# Patient Record
Sex: FEMALE | Race: WHITE | NOT HISPANIC OR LATINO | Employment: OTHER | URBAN - METROPOLITAN AREA
[De-identification: names, ages, dates, MRNs, and addresses within clinical notes are randomized per-mention and may not be internally consistent; named-entity substitution may affect disease eponyms.]

---

## 2018-01-12 NOTE — RESULT NOTES
Message   Recorded as Task   Date: 06/06/2016 05:05 PM, Created By: Teresa Fraser   Task Name: Verify Patient Results   Assigned To:  Liz Salmon   Regarding Patient: Nikki Fournier, Status: Active   CommentOlin Cobia - 06 Jun 2016 5:05 PM        Liz Salmon - 10 Angelito 2016 4:15 PM     TASK EDITED  Card sent that Pap was normal         Signatures   Electronically signed by : Corky Benjamin CNM; Angelito 10 2016  4:16PM EST                       (Author)

## 2018-01-12 NOTE — RESULT NOTES
Message   Recorded as Task   Date: 06/20/2016 03:12 PM, Created By: Davin Zuñiga   Task Name: Verify Patient Results   Assigned To: Liz Salmon   Regarding Patient: Francisco Marcelo, Status: Active   Comment:    Liz Salmon - 20 Jun 2016 3:12 PM        Liz Salmon - 20 Jun 2016 4:28 PM     TASK EDITED  Called patient discussed osteopenia        Verified Results  * MAMMO SCREENING BILATERAL W CAD 20Jun2016 01:55PM [place] [facility] Order Number: [id number]     Test Name Result Flag Reference   MAMMO SCREENING BILATERAL W CAD (Report)     Patient History:   Patient is postmenopausal    No known family history of cancer  Took hormonal contraceptives for 2 years  Took estrogen for 5    years  Patient has never smoked  Patient's BMI is 32 3  Reason for exam: screening (asymptomatic)  Mammo Screening Bilateral W CAD: June 20, 2016 - Check In #:    [de-identified]   Bilateral MLO and CC view(s) were taken  Technologist: RT Sarah(R)(M)   Prior study comparison: December 30, 2014, bilateral [facility] dig    scrn mammo w/CAD, performed at [address] [place] [address] Pkwy  December 16, 2013, bilateral [facility] dig scrn mammo    w/CAD, performed at [address]  October 24, 2012, bilateral digital screening mammogram, performed at [address] [place] [address]  August 29, 2011, bilateral digital    screening mammogram, performed at [address] [place] [address]    August 13, 2010, bilateral digital screening mammogram,    performed at [address] [place] [address]      There are scattered fibroglandular densities  The parenchymal pattern appears stable  No dominant soft tissue    mass or suspicious calcifications are noted  The skin and nipple   contours are within normal limits  No mammographic evidence of malignancy  No    significant changes when compared with prior studies  ASSESSMENT: BiRad:1 - Negative     Recommendation:   Routine screening mammogram in 1 year   A reminder letter will be   scheduled  Analyzed by CAD     8-10% of cancers will be missed on mammography  Management of a    palpable abnormality must be based on clinical grounds  Patients   will be notified of their results via letter from our facility  Accredited by [employer] of [employer] and FDA       Transcription Location: [address]: [id number]     Risk Value(s):   Tyrer-Cuzick 10 Year: 2 835%, Tyrer-Cuzick Lifetime: 5 917%,    Myriad Table: 1 5%, FLORENTIN 5 Year: 1 8%, NCI Lifetime: 6 9%   Signed by:   Sugar Carter MD   6/20/16       Signatures   Electronically signed by : Angela Aleman CNM; Jun 20 2016  4:32PM EST                       (Author)

## 2018-03-01 ENCOUNTER — OFFICE VISIT (OUTPATIENT)
Dept: OBGYN CLINIC | Facility: MEDICAL CENTER | Age: 68
End: 2018-03-01
Payer: MEDICARE

## 2018-03-01 VITALS
SYSTOLIC BLOOD PRESSURE: 120 MMHG | DIASTOLIC BLOOD PRESSURE: 78 MMHG | HEIGHT: 59 IN | WEIGHT: 163 LBS | BODY MASS INDEX: 32.86 KG/M2

## 2018-03-01 DIAGNOSIS — Z91.89 AT RISK FOR BONE DENSITY LOSS: ICD-10-CM

## 2018-03-01 DIAGNOSIS — Z01.419 ENCOUNTER FOR GYNECOLOGICAL EXAMINATION WITHOUT ABNORMAL FINDING: Primary | ICD-10-CM

## 2018-03-01 DIAGNOSIS — Z12.31 ENCOUNTER FOR SCREENING MAMMOGRAM FOR BREAST CANCER: ICD-10-CM

## 2018-03-01 DIAGNOSIS — Z78.0 POSTMENOPAUSAL: ICD-10-CM

## 2018-03-01 PROBLEM — E66.9 OBESITY (BMI 30.0-34.9): Status: ACTIVE | Noted: 2018-03-01

## 2018-03-01 PROBLEM — E03.9 HYPOTHYROIDISM: Status: ACTIVE | Noted: 2018-03-01

## 2018-03-01 PROBLEM — E78.00 HYPERCHOLESTEROLEMIA: Status: ACTIVE | Noted: 2018-03-01

## 2018-03-01 PROBLEM — M85.9 LOW BONE DENSITY: Status: ACTIVE | Noted: 2018-03-01

## 2018-03-01 PROBLEM — E66.811 OBESITY (BMI 30.0-34.9): Status: ACTIVE | Noted: 2018-03-01

## 2018-03-01 PROCEDURE — G0101 CA SCREEN;PELVIC/BREAST EXAM: HCPCS | Performed by: NURSE PRACTITIONER

## 2018-03-01 RX ORDER — ATORVASTATIN CALCIUM 40 MG/1
TABLET, FILM COATED ORAL
COMMUNITY
End: 2018-03-01 | Stop reason: ALTCHOICE

## 2018-03-01 RX ORDER — ATORVASTATIN CALCIUM 20 MG/1
TABLET, FILM COATED ORAL
COMMUNITY
Start: 2018-01-31

## 2018-03-01 RX ORDER — METOPROLOL SUCCINATE 50 MG/1
TABLET, EXTENDED RELEASE ORAL
COMMUNITY
Start: 2017-12-29

## 2018-03-01 RX ORDER — LISINOPRIL 10 MG/1
TABLET ORAL
COMMUNITY
Start: 2017-12-29

## 2018-03-01 RX ORDER — LEVOTHYROXINE SODIUM 0.03 MG/1
TABLET ORAL
COMMUNITY
Start: 2018-02-27

## 2018-03-01 RX ORDER — DOXYCYCLINE HYCLATE 100 MG/1
CAPSULE ORAL
COMMUNITY
Start: 2017-11-28 | End: 2018-03-01 | Stop reason: ALTCHOICE

## 2018-03-01 RX ORDER — PREDNISONE 20 MG/1
TABLET ORAL
COMMUNITY
Start: 2017-11-28 | End: 2018-03-01 | Stop reason: ALTCHOICE

## 2018-03-01 NOTE — PROGRESS NOTES
[facility]  [facility]  Roberto Richard 79 y o  SUBJECTIVE    HPI: Roberto Richard is a 79 y o   female presenting today for annual GYN exam  Her last gynecologic exam was 2016 at our practice  Postmenopausal, FMP around age 39  Patient denies post-menopausal vaginal bleeding  Matthias Garcia Sexually active with 1 Male partner, monogamous,   No STI testing today  Denies sexual concerns  Has no breast, bowel, or vaginal concerns  KEEGAN unchanged and not bothersome  Retired  Gynecologic History  Last pap smear: Date: 2016 and Result: NILM, HPV(-)  History of abnormal pap smears: Denies  The patient denies history of sexually transmitted disease  Last mammogram: 2016, BI RADS - 1  History of abnormal mammogram: Denies  Contraceptive method: Postmenopausal  Dyspareunia: denies      Obstetric History     x1, CSEC x1      Health Maintenance  Self-breast exams: yes  Metabolic screening: up-to-date with PCP (daria)  Calcium & Vitamin D: taking  Tobacco cessation: N/A  DEXA bone density scan: ordered; last was 2016 (low BMD, return in 18-24mos)  Colonoscopy: last was 7-8y ago, return in 10y    At todays visit I discussed the importance of self-breast exams, exercise and healthy diet  The recommendation is for annual mammograms  Reviewed ASCCP guidelines for cervical cancer screening, which include discontinuing screening after age 72 in the presence of a normal pap smear history  Safe sex practices were strongly encouraged to protect against sexually transmitted infections  Importance of vitamin D and adequate calcium intake was reviewed  All questions were answered to her apparent satisfaction       The following portions of the patient's history were reviewed and updated as appropriate: allergies, current medications, past family history, past medical history, past social history, past surgical history and problem list     ROS  General ROS: negative for chills or fever  Breast ROS: negative for breast lumps  Respiratory ROS: no cough, shortness of breath, or wheezing  Cardiovascular ROS: no chest pain or dyspnea on exertion  Gastrointestinal ROS: no abdominal pain, change in bowel habits, or black or bloody stools  Genito-Urinary ROS: no dysuria, trouble voiding, or hematuria  negative for - vulvar/vaginal symptoms  Neurological ROS: negative    OBJECTIVE  Vitals:    03/01/18 0909   BP: 120/78   BP Location: Left arm   Patient Position: Sitting   Weight: 73 9 kg (163 lb)   Height: 4' 11" (1 499 m)       Physical Exam   Constitutional: She is oriented to person, place, and time  Vital signs are normal  She appears well-developed and well-nourished  Genitourinary: Vagina normal and uterus normal  Pelvic exam was performed with patient supine  There is no rash, tenderness or lesion on the right labia  There is no rash, tenderness or lesion on the left labia  No erythema in the vagina  No vaginal discharge found  Right adnexum does not display mass and does not display tenderness  Left adnexum does not display mass and does not display tenderness  Cervix is parous  Cervix does not exhibit motion tenderness  Uterus is not tender  HENT:   Head: Normocephalic and atraumatic  Neck: No thyromegaly present  Cardiovascular: Normal heart sounds  Pulmonary/Chest: Effort normal and breath sounds normal  Right breast exhibits no inverted nipple, no mass, no nipple discharge, no skin change and no tenderness  Left breast exhibits no inverted nipple, no mass, no nipple discharge, no skin change and no tenderness  Breasts are symmetrical    Abdominal: Soft  Normal appearance  Musculoskeletal: Normal range of motion  Lymphadenopathy:     She has no axillary adenopathy  Neurological: She is alert and oriented to person, place, and time  Skin: Skin is warm, dry and intact  Psychiatric: She has a normal mood and affect   Her behavior is normal  Vitals reviewed  ASSESSMENT  Normal postmenopausal female exam        PLAN  1 - Order for annual mammogram provided to patient at todays visit  2 - Order for repeat DEXA bone density scan provided to patient at today's visit  3 - RTO 1 year for annual GYN exam        All questions were answered & Carina expressed understanding        Khurram Reaves

## 2018-06-01 ENCOUNTER — HOSPITAL ENCOUNTER (OUTPATIENT)
Dept: RADIOLOGY | Facility: MEDICAL CENTER | Age: 68
Discharge: HOME | End: 2018-06-01
Payer: MEDICARE

## 2018-06-01 DIAGNOSIS — Z91.89 AT RISK FOR BONE DENSITY LOSS: ICD-10-CM

## 2018-06-01 DIAGNOSIS — Z78.0 POSTMENOPAUSAL: ICD-10-CM

## 2018-06-01 DIAGNOSIS — Z12.31 ENCOUNTER FOR SCREENING MAMMOGRAM FOR BREAST CANCER: ICD-10-CM

## 2018-06-01 PROCEDURE — 77063 BREAST TOMOSYNTHESIS BI: CPT

## 2018-06-01 PROCEDURE — 77080 DXA BONE DENSITY AXIAL: CPT

## 2018-06-01 PROCEDURE — 77067 SCR MAMMO BI INCL CAD: CPT

## 2018-06-05 NOTE — PROGRESS NOTES
Please call patient regarding her bone mineral density scan results  No significant change from previous scans showing Low BMD  Recommend 1200 mg calcium and 1000 IU of Vitamin D, as well as weight bearing and muscle strengthening exercises  She will need repeat DXA in 18-24 months on the same machine  Thank you!

## 2018-06-06 ENCOUNTER — TELEPHONE (OUTPATIENT)
Dept: OBGYN CLINIC | Facility: MEDICAL CENTER | Age: 68
End: 2018-06-06

## 2018-06-06 NOTE — TELEPHONE ENCOUNTER
----- Message from [place] J[place] sent at 6/5/2018  8:03 AM EDT -----  Please call patient regarding her bone mineral density scan results  No significant change from previous scans showing Low BMD  Recommend 1200 mg calcium and 1000 IU of Vitamin D, as well as weight bearing and muscle strengthening exercises  She will need repeat DXA in 18-24 months on the same machine  Thank you! Tried to call patient x2 and no VM or AM to LM  Sound like someone is there but no answer and then call drops  No communication consent on file

## 2019-08-05 ENCOUNTER — TELEPHONE (OUTPATIENT)
Dept: OBGYN CLINIC | Facility: CLINIC | Age: 69
End: 2019-08-05

## 2019-08-05 DIAGNOSIS — Z12.39 BREAST SCREENING, UNSPECIFIED: Primary | ICD-10-CM

## 2019-08-14 ENCOUNTER — TRANSCRIBE ORDERS (OUTPATIENT)
Dept: ADMINISTRATIVE | Facility: HOSPITAL | Age: 69
End: 2019-08-14

## 2019-08-14 DIAGNOSIS — Z12.39 BREAST SCREENING, UNSPECIFIED: Primary | ICD-10-CM

## 2019-09-18 ENCOUNTER — HOSPITAL ENCOUNTER (OUTPATIENT)
Dept: RADIOLOGY | Facility: MEDICAL CENTER | Age: 69
Discharge: HOME | End: 2019-09-18
Payer: MEDICARE

## 2019-09-18 VITALS — WEIGHT: 163 LBS | HEIGHT: 59 IN | BODY MASS INDEX: 32.86 KG/M2

## 2019-09-18 DIAGNOSIS — Z12.39 BREAST SCREENING, UNSPECIFIED: ICD-10-CM

## 2019-09-18 PROCEDURE — 77063 BREAST TOMOSYNTHESIS BI: CPT

## 2019-09-18 PROCEDURE — 77067 SCR MAMMO BI INCL CAD: CPT

## 2020-10-26 ENCOUNTER — ANNUAL EXAM (OUTPATIENT)
Dept: OBGYN CLINIC | Facility: MEDICAL CENTER | Age: 70
End: 2020-10-26
Payer: MEDICARE

## 2020-10-26 VITALS
TEMPERATURE: 97.5 F | WEIGHT: 164 LBS | BODY MASS INDEX: 33.12 KG/M2 | SYSTOLIC BLOOD PRESSURE: 132 MMHG | DIASTOLIC BLOOD PRESSURE: 86 MMHG

## 2020-10-26 DIAGNOSIS — Z12.31 ENCOUNTER FOR SCREENING MAMMOGRAM FOR MALIGNANT NEOPLASM OF BREAST: ICD-10-CM

## 2020-10-26 DIAGNOSIS — Z13.820 SCREENING FOR OSTEOPOROSIS: Primary | ICD-10-CM

## 2020-10-26 DIAGNOSIS — Z01.419 ENCOUNTER FOR GYNECOLOGICAL EXAMINATION (GENERAL) (ROUTINE) WITHOUT ABNORMAL FINDINGS: ICD-10-CM

## 2020-10-26 DIAGNOSIS — Z00.00 HEALTHCARE MAINTENANCE: ICD-10-CM

## 2020-10-26 DIAGNOSIS — Z78.0 POSTMENOPAUSAL STATUS: ICD-10-CM

## 2020-10-26 DIAGNOSIS — Z80.3 FAMILY HISTORY OF BREAST CANCER IN FIRST DEGREE RELATIVE: ICD-10-CM

## 2020-10-26 PROCEDURE — G0101 CA SCREEN;PELVIC/BREAST EXAM: HCPCS | Performed by: NURSE PRACTITIONER

## 2021-02-13 DIAGNOSIS — Z23 ENCOUNTER FOR IMMUNIZATION: ICD-10-CM

## 2021-07-22 ENCOUNTER — HOSPITAL ENCOUNTER (OUTPATIENT)
Dept: RADIOLOGY | Facility: MEDICAL CENTER | Age: 71
Discharge: HOME | End: 2021-07-22
Payer: MEDICARE

## 2021-07-22 VITALS — BODY MASS INDEX: 33.06 KG/M2 | WEIGHT: 164 LBS | HEIGHT: 59 IN

## 2021-07-22 DIAGNOSIS — Z78.0 POSTMENOPAUSAL STATUS: ICD-10-CM

## 2021-07-22 DIAGNOSIS — Z12.31 ENCOUNTER FOR SCREENING MAMMOGRAM FOR MALIGNANT NEOPLASM OF BREAST: ICD-10-CM

## 2021-07-22 DIAGNOSIS — Z13.820 SCREENING FOR OSTEOPOROSIS: ICD-10-CM

## 2021-07-22 PROCEDURE — 77063 BREAST TOMOSYNTHESIS BI: CPT

## 2021-07-22 PROCEDURE — 77067 SCR MAMMO BI INCL CAD: CPT

## 2021-07-22 PROCEDURE — 77080 DXA BONE DENSITY AXIAL: CPT

## 2021-07-26 ENCOUNTER — TELEPHONE (OUTPATIENT)
Dept: OBGYN CLINIC | Facility: MEDICAL CENTER | Age: 71
End: 2021-07-26

## 2021-07-26 NOTE — TELEPHONE ENCOUNTER
Spoke to pt and let her know results   Vit D she is taking 2,000Iu/day and not sure aboutthe Ca, but told her to check and make sure its atleast 1200 mg

## 2021-07-26 NOTE — TELEPHONE ENCOUNTER
Her DEXA shows osteopenia   Recommendations are:  A daily intake of at least 1200 mg calcium and 1000 IU of Vitamin D, as well as weight bearing and muscle strengthening exercise, fall prevention and avoidance of tobacco and excessive alcohol intake as basic preventive measures are suggested  Repeat in 2 years

## 2022-11-17 ENCOUNTER — HOSPITAL ENCOUNTER (OUTPATIENT)
Dept: RADIOLOGY | Facility: MEDICAL CENTER | Age: 72
Discharge: HOME | End: 2022-11-17

## 2022-11-17 VITALS — HEIGHT: 59 IN | WEIGHT: 164 LBS | BODY MASS INDEX: 33.06 KG/M2

## 2022-11-17 DIAGNOSIS — Z12.31 ENCOUNTER FOR SCREENING MAMMOGRAM FOR MALIGNANT NEOPLASM OF BREAST: ICD-10-CM

## 2023-03-27 ENCOUNTER — APPOINTMENT (OUTPATIENT)
Dept: RADIOLOGY | Age: 73
End: 2023-03-27

## 2023-03-27 ENCOUNTER — OFFICE VISIT (OUTPATIENT)
Dept: OBGYN CLINIC | Facility: CLINIC | Age: 73
End: 2023-03-27

## 2023-03-27 VITALS
HEART RATE: 72 BPM | HEIGHT: 59 IN | DIASTOLIC BLOOD PRESSURE: 79 MMHG | WEIGHT: 164 LBS | BODY MASS INDEX: 33.06 KG/M2 | SYSTOLIC BLOOD PRESSURE: 133 MMHG

## 2023-03-27 DIAGNOSIS — M25.562 ACUTE PAIN OF LEFT KNEE: Primary | ICD-10-CM

## 2023-03-27 DIAGNOSIS — M17.12 PRIMARY OSTEOARTHRITIS OF LEFT KNEE: ICD-10-CM

## 2023-03-27 DIAGNOSIS — M25.562 ACUTE PAIN OF LEFT KNEE: ICD-10-CM

## 2023-03-27 RX ORDER — BUPIVACAINE HYDROCHLORIDE 2.5 MG/ML
2 INJECTION, SOLUTION INFILTRATION; PERINEURAL
Status: COMPLETED | OUTPATIENT
Start: 2023-03-27 | End: 2023-03-27

## 2023-03-27 RX ORDER — DICLOFENAC SODIUM 75 MG/1
TABLET, DELAYED RELEASE ORAL
COMMUNITY
Start: 2023-03-24

## 2023-03-27 RX ORDER — TRIAMCINOLONE ACETONIDE 40 MG/ML
80 INJECTION, SUSPENSION INTRA-ARTICULAR; INTRAMUSCULAR
Status: COMPLETED | OUTPATIENT
Start: 2023-03-27 | End: 2023-03-27

## 2023-03-27 RX ADMIN — TRIAMCINOLONE ACETONIDE 80 MG: 40 INJECTION, SUSPENSION INTRA-ARTICULAR; INTRAMUSCULAR at 15:27

## 2023-03-27 RX ADMIN — BUPIVACAINE HYDROCHLORIDE 2 ML: 2.5 INJECTION, SOLUTION INFILTRATION; PERINEURAL at 15:27

## 2023-03-27 NOTE — PATIENT INSTRUCTIONS
Hyaluronic Acid (By injection)   Hyaluronic Acid (uny-iv-zex-ON-ate AS-id)  Treats severe pain in your knee due to osteoarthritis  Brand Name(s): Durolane, Euflexxa, Gel-One, GelSyn-3, GenVisc 850, Hyalgan, Hymovis, Monovisc, Orthovisc, Supartz FX, TriLURON, TriVisc, Visco-3   There may be other brand names for this medicine  When This Medicine Should Not Be Used: This medicine is not right for everyone  You should not receive it if you had an allergic reaction to hyaluronic acid or if you have a bleeding disorder  How to Use This Medicine:   Injectable  Your doctor will tell you how many injections you will need  This medicine is injected into your knee joint  A nurse or other health provider will give you this medicine  Drugs and Foods to Avoid:      Ask your doctor or pharmacist before using any other medicine, including over-the-counter medicines, vitamins, and herbal products  Warnings While Using This Medicine:   Tell your doctor if you are pregnant or breastfeeding, or if you have any allergies, including to birds, feathers, or eggs  Rest your knee for 48 hours after you receive an injection  Do not do strenuous, weightbearing activities, such as jogging or tennis  Avoid activities that keep you standing for longer than 1 hour  Possible Side Effects While Using This Medicine:   Call your doctor right away if you notice any of these side effects: Allergic reaction: Itching or hives, swelling in your face or hands, swelling or tingling in your mouth or throat, chest tightness, trouble breathing  If you notice these less serious side effects, talk with your doctor:   Mild increase in pain or swelling in your knee  Pain, redness, or swelling where the medicine is injected  If you notice other side effects that you think are caused by this medicine, tell your doctor  Call your doctor for medical advice about side effects   You may report side effects to FDA at 9-421-FDA-1521  © Copyright [employer] 2022 Information is for End User's use only and may not be sold, redistributed or otherwise used for commercial purposes  The above information is an  only  It is not intended as medical advice for individual conditions or treatments  Talk to your doctor, nurse or pharmacist before following any medical regimen to see if it is safe and effective for you

## 2023-03-27 NOTE — PROGRESS NOTES
Orthopaedic Surgery - Office Note  Georgie Cr (1 y o  female)   : 1950   MRN: [id number]  Encounter Date: 3/27/2023    Chief Complaint   Patient presents with   • Left Knee - Pain         Assessment/Plan  Diagnoses and all orders for this visit:    Acute pain of left knee  -     XR knee 3 vw left non injury; Future    Primary osteoarthritis of left knee    Other orders  -     diclofenac (VOLTAREN) 75 mg EC tablet  -     Large joint arthrocentesis    The left knee osteoarthritis diagnosis was discussed with the patient at length today  Conservative treatments consist of icing, oral anti-inflammatories which she is already taking and physical therapy  She does not wish to consider physical therapy or bracing at this time  The risks and benefits of a cortisone injection were reviewed with her in the office today and she would like to consider a cortisone injection  We reviewed if the cortisone injection does not completely resolve her symptoms a Visco injection series could be considered and she will be provided information regarding this in her after visit summary  The definitive treatment of a total knee arthroplasty was reviewed and she will discuss further with Dr Lizbeth Bolden with whom she has an appointment upcoming in late April  She does not wish to consider a total knee arthroplasty at this time however and it is felt safe to proceed with a cortisone injection  Return for Recheck with Dr Lizbeth Bolden (patient already has appt)  History of Present Illness  This is a new patient with left knee pain  Patient reports she has had ongoing left knee pain for many years but it is gotten acutely worse  She denies any new trauma that started the pain  The pain is primarily in the lateral aspect but also has anterior medial pain as well  Her PCP started her on diclofenac 75 mg which helped a little bit  No calf pain or paresthesias are reported    She reports she has had cortisone injections in "multiple shoulders feet and hands without any problems  She does not wish to consider a total knee replacement at this time  She reports that she has an upcoming appointment in late April with Dr Michelle Puente  Patient is not diabetic    Review of Systems  Pertinent items are noted in HPI  All other systems were reviewed and are negative  Physical Exam  /79 (BP Location: Right arm, Patient Position: Sitting, Cuff Size: Standard)   Pulse 72   Ht 4' 11\" (1 499 m)   Wt 74 4 kg (164 lb)   BMI 33 12 kg/m²   Cons: Appears well  No apparent distress  Psych: Alert  Oriented x3  Mood and affect normal   Eyes: PERRLA, EOMI  Resp: Normal effort  No audible wheezing or stridor  CV: Palpable pulse  No discernable arrhythmia  Lymph:  No palpable cervical, axillary, or inguinal lymphadenopathy  Skin: Warm  No palpable masses  No visible lesions  Neuro: Normal muscle tone  Normal and symmetric DTR's  Patient's left knee is without intra-articular effusion today  She has tenderness in the lateral joint line  Slight valgus deformity  Mild medial joint line tenderness  Moderate retropatellar crepitus through a full extension and flexion to 120 degrees  Quad and hamstring strength are well-maintained at 5 out of 5  There is no pain or instability with valgus and varus stressing at full extension and 20 degrees of flexion  She has no calf tenderness and a negative Homans  Her hip moves well and she has a negative straight leg raise  X-rays performed in the office today 3 views of the left knee show no acute fractures or dislocations  She has severe lateral joint space collapse with bone-on-bone deformity and osteophyte formations noted in the medial tibial plateau and medial femoral condyle  Medial joint space narrowing is noted  Moderate patellofemoral degenerative changes are noted with osteophyte formation appreciated    X-rays were reviewed from an orthopedic standpoint will await official " "radiologist interpretation  Studies Reviewed      Large joint arthrocentesis: L knee  Universal Protocol:  Consent: Verbal consent obtained  Risks and benefits: risks, benefits and alternatives were discussed  Consent given by: patient  Time out: Immediately prior to procedure a \"time out\" was called to verify the correct patient, procedure, equipment, support staff and site/side marked as required  Patient understanding: patient states understanding of the procedure being performed  Patient consent: the patient's understanding of the procedure matches consent given  Relevant documents: relevant documents present and verified  Test results: test results available and properly labeled  Site marked: the operative site was marked  Radiology Images displayed and confirmed  If images not available, report reviewed: imaging studies available  Patient identity confirmed: verbally with patient    Supporting Documentation  Indications: pain   Procedure Details  Location: knee - L knee  Preparation: Patient was prepped and draped in the usual sterile fashion  Needle size: 22 G  Ultrasound guidance: no  Approach: anterolateral  Medications administered: 2 mL bupivacaine 0 25 %; 80 mg triamcinolone acetonide 40 mg/mL    Patient tolerance: patient tolerated the procedure well with no immediate complications  Dressing:  Sterile dressing applied        Medical, Surgical, Family, and Social History  The patient's medical history, family history, and social history, were reviewed and updated as appropriate      Past Medical History:   Diagnosis Date   • Asymptomatic menopausal state    • Breast lump    • Hyperlipemia    • Pap smear abnormality of cervix with ASCUS favoring benign    • Plantar fasciitis        Past Surgical History:   Procedure Laterality Date   • CATARACT EXTRACTION     •  SECTION     • COLONOSCOPY         Family History   Problem Relation Age of Onset   • Breast cancer Sister         Diagnosed in " her 45s   • No Known Problems Mother    • No Known Problems Father    • No Known Problems Maternal Grandmother    • No Known Problems Maternal Grandfather    • No Known Problems Paternal Grandmother    • No Known Problems Paternal Grandfather    • No Known Problems Son    • No Known Problems Son        Social History     Occupational History   • Occupation: Retired   Tobacco Use   • Smoking status: Never   • Smokeless tobacco: Never   Substance and Sexual Activity   • Alcohol use: Yes     Comment: remote social alcohol use   • Drug use: No   • Sexual activity: Yes     Partners: Male     Birth control/protection: Post-menopausal       Allergies   Allergen Reactions   • Penicillins          Current Outpatient Medications:   •  Aspirin Buf,CaCarb-MgCarb-MgO, 81 MG TABS, Take by mouth, Disp: , Rfl:   •  atorvastatin (LIPITOR) 20 mg tablet, , Disp: , Rfl:   •  cholecalciferol (VITAMIN D3) 1,000 units tablet, Take 1 tablet by mouth daily, Disp: , Rfl:   •  levothyroxine 25 mcg tablet, , Disp: , Rfl:   •  lisinopril (ZESTRIL) 10 mg tablet, , Disp: , Rfl:   •  metoprolol succinate (TOPROL-XL) 50 mg 24 hr tablet, , Disp: , Rfl:   •  diclofenac (VOLTAREN) 75 mg EC tablet, , Disp: , Rfl:       Tutu Vogt PA-C

## 2023-04-28 ENCOUNTER — OFFICE VISIT (OUTPATIENT)
Dept: OBGYN CLINIC | Facility: MEDICAL CENTER | Age: 73
End: 2023-04-28

## 2023-04-28 VITALS
SYSTOLIC BLOOD PRESSURE: 122 MMHG | WEIGHT: 163.2 LBS | HEART RATE: 66 BPM | DIASTOLIC BLOOD PRESSURE: 74 MMHG | BODY MASS INDEX: 32.9 KG/M2 | HEIGHT: 59 IN

## 2023-04-28 DIAGNOSIS — M21.062 ACQUIRED VALGUS DEFORMITY KNEE, LEFT: ICD-10-CM

## 2023-04-28 DIAGNOSIS — M17.12 PRIMARY OSTEOARTHRITIS OF LEFT KNEE: Primary | ICD-10-CM

## 2023-04-28 NOTE — PROGRESS NOTES
Assessment:  1  Primary osteoarthritis of left knee  Injection Procedure Prior Authorization      2  Acquired valgus deformity knee, left            Plan:  The patient has left knee osteoarthritis and acquired valgus deformity of left knee  Treatment options were discussed with the patient including physical therapy, viscosupplementation and total knee arthroplasty  The patient elected to move forward with viscosupplementation as the corticosteroid injection is beginning to wear off  Authorization was requested for viscosupplementation  I will see her back in the office once Durolane is approved  To do next visit:  Return for Visco once approved  The above stated was discussed in layman's terms and the patient expressed understanding  All questions were answered to the patient's satisfaction  Scribe Attestation    I,:  Emily Pickett am acting as a scribe while in the presence of the attending physician :       I,:  Red Mon MD personally performed the services described in this documentation    as scribed in my presence :             Subjective:   Stefanie Kaminski is a 67 y o  female who presents for initial evaluation of left knee pain  She received a corticosteroid injection from [facility] on 3/27/23  She notes the corticosteroid injection did provide relief however it is beginning to wear off  Pain has been present for about 6 months but has worsened over the past month  Pain is described as sharp and shooting and israted 6-10/10  She does have instability when she is standing and goes to move  Pain wakes her up at night but has not woken her at night since the injection  She takes diclofenac and extra strength Tylenol, aspercreme, Biofreeze as needed for symptom management  She is looking for more options as far as what she can do with her knee          Review of systems negative unless otherwise specified in HPI    Past Medical History:   Diagnosis Date   • Asymptomatic menopausal state    • Breast lump    • Hyperlipemia    • Pap smear abnormality of cervix with ASCUS favoring benign    • Plantar fasciitis        Past Surgical History:   Procedure Laterality Date   • CATARACT EXTRACTION     •  SECTION     • COLONOSCOPY         Family History   Problem Relation Age of Onset   • Breast cancer Sister         Diagnosed in her 45s   • No Known Problems Mother    • No Known Problems Father    • No Known Problems Maternal Grandmother    • No Known Problems Maternal Grandfather    • No Known Problems Paternal Grandmother    • No Known Problems Paternal Grandfather    • No Known Problems Son    • No Known Problems Son        Social History     Occupational History   • Occupation: Retired   Tobacco Use   • Smoking status: Never   • Smokeless tobacco: Never   Vaping Use   • Vaping Use: Not on file   Substance and Sexual Activity   • Alcohol use: Yes     Comment: remote social alcohol use   • Drug use: No   • Sexual activity: Yes     Partners: Male     Birth control/protection: Post-menopausal         Current Outpatient Medications:   •  Aspirin Buf,CaCarb-MgCarb-MgO, 81 MG TABS, Take by mouth, Disp: , Rfl:   •  atorvastatin (LIPITOR) 20 mg tablet, , Disp: , Rfl:   •  cholecalciferol (VITAMIN D3) 1,000 units tablet, Take 1 tablet by mouth daily, Disp: , Rfl:   •  diclofenac (VOLTAREN) 75 mg EC tablet, , Disp: , Rfl:   •  levothyroxine 25 mcg tablet, , Disp: , Rfl:   •  lisinopril (ZESTRIL) 10 mg tablet, , Disp: , Rfl:   •  metoprolol succinate (TOPROL-XL) 50 mg 24 hr tablet, , Disp: , Rfl:     Allergies   Allergen Reactions   • Penicillins             Vitals:    23 1328   BP: 122/74   Pulse: 66       Objective:  Physical exam  · General: Awake, Alert, Oriented  · Eyes: Pupils equal, round and reactive to light  · Heart: regular rate and rhythm  · Lungs: No audible wheezing  · Abdomen: soft                    Ortho Exam  Left Knee  Range of motion from 0 to 100      Valgus alignment  There is "mild crepitus with range of motion  There is no effusion  There is tenderness over the lateral joint line  The patient is able to perform a straight leg raise  Toes are pink and mobile  Compartments are soft  Brisk cap refill  Sensation intact  No ligament laxity  The patient is neurovascular intact distally  Diagnostics, reviewed and taken today if performed as documented: The attending physician has personally reviewed the pertinent films in PACS and interpretation is as follows:    X-rays taken 3/27/23 of right knee demonstrates tricompartmental moderate degenerative changes with osteophyte formation  No acute osseous abnormality or fracture  Procedures, if performed today:    Procedures    None performed      Portions of the record may have been created with voice recognition software  Occasional wrong word or \"sound a like\" substitutions may have occurred due to the inherent limitations of voice recognition software  Read the chart carefully and recognize, using context, where substitutions have occurred      "

## 2023-06-23 ENCOUNTER — PROCEDURE VISIT (OUTPATIENT)
Dept: OBGYN CLINIC | Facility: MEDICAL CENTER | Age: 73
End: 2023-06-23
Payer: MEDICARE

## 2023-06-23 VITALS
HEIGHT: 59 IN | BODY MASS INDEX: 32.86 KG/M2 | HEART RATE: 68 BPM | WEIGHT: 163 LBS | SYSTOLIC BLOOD PRESSURE: 136 MMHG | DIASTOLIC BLOOD PRESSURE: 80 MMHG

## 2023-06-23 DIAGNOSIS — M17.12 ARTHRITIS OF LEFT KNEE: Primary | ICD-10-CM

## 2023-06-23 DIAGNOSIS — M25.562 CHRONIC PAIN OF LEFT KNEE: ICD-10-CM

## 2023-06-23 DIAGNOSIS — G89.29 CHRONIC PAIN OF LEFT KNEE: ICD-10-CM

## 2023-06-23 PROCEDURE — 20610 DRAIN/INJ JOINT/BURSA W/O US: CPT | Performed by: PHYSICIAN ASSISTANT

## 2023-06-23 NOTE — PROGRESS NOTES
Subjective; Female patient with established osteoarthritis of her left knee  She is caring for her   She had symptoms return to her knee after a well-placed injection of steroid, which underperformed  She was approved for Durolane, and presents today to receive same  Past Medical History:   Diagnosis Date   • Asymptomatic menopausal state    • Breast lump    • Hyperlipemia    • Pap smear abnormality of cervix with ASCUS favoring benign    • Plantar fasciitis        Past Surgical History:   Procedure Laterality Date   • CATARACT EXTRACTION     •  SECTION     • COLONOSCOPY         Family History   Problem Relation Age of Onset   • Breast cancer Sister         Diagnosed in her 45s   • No Known Problems Mother    • No Known Problems Father    • No Known Problems Maternal Grandmother    • No Known Problems Maternal Grandfather    • No Known Problems Paternal Grandmother    • No Known Problems Paternal Grandfather    • No Known Problems Son    • No Known Problems Son        Social History     Tobacco Use   • Smoking status: Never   • Smokeless tobacco: Never   Substance Use Topics   • Alcohol use: Yes     Comment: remote social alcohol use   • Drug use: No   Exam; left knee is devoid of any bruising redness or palpable warmth and allows for safe administration of medication at today's appointment    Large joint arthrocentesis: L knee  Procedure Details  Location: knee - L knee  Needle size: 22 G (RP 27 G)  Medications administered: 3 mL sodium hyaluronate 60 MG/3ML    Patient tolerance: patient tolerated the procedure well with no immediate complications  Dressing:  Sterile dressing applied        Impression; Left knee osteoarthritis  Left knee pain    Plan;    Durolane was administered at today's appointment  Future medicine being reordered depends on her outcome  I did not order future Durolane at this time or any other Visco supplement pending her outcome    She returns in 3 additional months and clinical follow up  She may go to the [facility] next week  She was wished well with her 's care    Her experience was supervised by and plan formulated by the attending surgeon it was my privilege to assist him in delivery of her care

## 2023-08-16 ENCOUNTER — APPOINTMENT (OUTPATIENT)
Dept: RADIOLOGY | Facility: MEDICAL CENTER | Age: 73
End: 2023-08-16
Payer: MEDICARE

## 2023-08-16 DIAGNOSIS — M79.671 RIGHT FOOT PAIN: ICD-10-CM

## 2023-08-16 PROCEDURE — 73630 X-RAY EXAM OF FOOT: CPT

## 2023-09-15 ENCOUNTER — OFFICE VISIT (OUTPATIENT)
Dept: OBGYN CLINIC | Facility: MEDICAL CENTER | Age: 73
End: 2023-09-15
Payer: MEDICARE

## 2023-09-15 VITALS
BODY MASS INDEX: 32.25 KG/M2 | SYSTOLIC BLOOD PRESSURE: 157 MMHG | HEART RATE: 69 BPM | DIASTOLIC BLOOD PRESSURE: 73 MMHG | WEIGHT: 160 LBS | HEIGHT: 59 IN

## 2023-09-15 DIAGNOSIS — Z79.01 ANTICOAGULATION MANAGEMENT ENCOUNTER: ICD-10-CM

## 2023-09-15 DIAGNOSIS — Z51.81 ANTICOAGULATION MANAGEMENT ENCOUNTER: ICD-10-CM

## 2023-09-15 DIAGNOSIS — M17.12 PRIMARY OSTEOARTHRITIS OF LEFT KNEE: Primary | ICD-10-CM

## 2023-09-15 PROCEDURE — 99214 OFFICE O/P EST MOD 30 MIN: CPT | Performed by: ORTHOPAEDIC SURGERY

## 2023-09-15 RX ORDER — CEFAZOLIN SODIUM 2 G/50ML
2000 SOLUTION INTRAVENOUS ONCE
OUTPATIENT
Start: 2023-09-15 | End: 2023-09-15

## 2023-09-15 RX ORDER — ENOXAPARIN SODIUM 100 MG/ML
40 INJECTION SUBCUTANEOUS DAILY
Qty: 11.2 ML | Refills: 0 | Status: SHIPPED | OUTPATIENT
Start: 2023-09-15 | End: 2023-10-13

## 2023-09-15 RX ORDER — SODIUM CHLORIDE, SODIUM LACTATE, POTASSIUM CHLORIDE, CALCIUM CHLORIDE 600; 310; 30; 20 MG/100ML; MG/100ML; MG/100ML; MG/100ML
125 INJECTION, SOLUTION INTRAVENOUS CONTINUOUS
OUTPATIENT
Start: 2023-09-15

## 2023-09-15 RX ORDER — CHLORHEXIDINE GLUCONATE ORAL RINSE 1.2 MG/ML
15 SOLUTION DENTAL ONCE
OUTPATIENT
Start: 2023-09-15 | End: 2023-09-15

## 2023-09-15 RX ORDER — TRANEXAMIC ACID 10 MG/ML
1000 INJECTION, SOLUTION INTRAVENOUS ONCE
OUTPATIENT
Start: 2023-09-15 | End: 2023-09-15

## 2023-09-15 RX ORDER — FOLIC ACID 1 MG/1
1 TABLET ORAL DAILY
Qty: 30 TABLET | Refills: 0 | Status: SHIPPED | OUTPATIENT
Start: 2023-09-15 | End: 2023-10-15

## 2023-09-15 RX ORDER — ASCORBIC ACID 500 MG
500 TABLET ORAL 2 TIMES DAILY
Qty: 60 TABLET | Refills: 0 | Status: SHIPPED | OUTPATIENT
Start: 2023-09-15 | End: 2023-10-15

## 2023-09-15 RX ORDER — FERROUS SULFATE TAB EC 324 MG (65 MG FE EQUIVALENT) 324 (65 FE) MG
324 TABLET DELAYED RESPONSE ORAL
Qty: 30 TABLET | Refills: 0 | Status: SHIPPED | OUTPATIENT
Start: 2023-09-15 | End: 2023-10-15

## 2023-09-15 NOTE — PROGRESS NOTES
Assessment/Plan:    Ms. Elif Lundberg has primary osteoarthritis of the left knee. She has previously underwent conservative intervention which has failed, and she is now interested in undergoing left total knee arthroplasty. · The risks and benefits of left total knee arthroplasty were discussed. These include but are not limited to infection, bleeding, damage to surrounding structures, nerve damage, loss of function, blood clot. She understands these and wishes to proceed with operative intervention. Informed consent obtained in clinic today. · Pre operative iron, folic acid, and ascorbic acid leading up to surgery. · She will require lovenox post operatively, which will be prescribed now but not initiated until after surgery. · Return to care 2 weeks post op for wound check and staple removal.        Subjective:   HPI   Patient ID: Eunice Smith is a 67 y.o. female who presents for reevaluation of left knee pain. She has known left knee osteoarthritis. She first received a corticosteroid injection on 3/27/2023, which did provide her relief. She then received viscosupplementation on 2023, which she reports did not provide significant relief beyond the first week following injection. She describes the pain as sharp and shooting and rates it at a 7 out of 10, which is worsening over time. She reports that on occasion her knee locks and feels unstable. The pain is most noticeable while resting in bed at night. She has tried conservative measures including Tylenol, Aspercreme, Biofreeze and other topical symptomatic treatment, however she feels she has exhausted conservative management at this point. She would like to discuss left total knee arthroplasty. Her BMI is 32.32, she does not have diabetes, she does not take blood thinners, she does not smoke. Her only surgical history included a  almost 40 years ago at which time she did not have any complications from anesthesia.       Review of Systems    Negative unless otherwise stated above. Objective:      /73   Pulse 69   Ht 4' 11" (1.499 m)   Wt 72.6 kg (160 lb)   BMI 32.32 kg/m²          Physical Exam    General: No acute distress, well-appearing, well-nourished adult female  HEENT: Mucous membranes moist and well perfused, pupils equal and reactive to light, sclera anicteric  Cardio: Normal rate, regular rhythm, no murmurs rubs or gallops  Pulmonary: Equal breath sounds bilaterally, no wheezes, rales, or rhonchi  GI: Abdomen nontender in all 4 quadrants and epigastrium  Musculoskeletal:  Left knee exam  Skin intact. No erythema, warmth. Mild effusion. Tender to palpation lateral joint line. Range of motion from 0 through 120 degrees of flexion. Normal patellar tracking. Crepitus felt throughout motion arc. Motor and sensory intact. The extremity is warm and well-perfused.

## 2023-09-19 DIAGNOSIS — Z01.818 PRE-OP TESTING: ICD-10-CM

## 2023-09-19 DIAGNOSIS — Z01.818 PRE-OP EVALUATION: Primary | ICD-10-CM

## 2023-09-20 ENCOUNTER — TELEPHONE (OUTPATIENT)
Dept: ANESTHESIOLOGY | Facility: CLINIC | Age: 73
End: 2023-09-20

## 2023-09-26 ENCOUNTER — TELEPHONE (OUTPATIENT)
Dept: OBGYN CLINIC | Facility: MEDICAL CENTER | Age: 73
End: 2023-09-26

## 2023-10-24 NOTE — TELEPHONE ENCOUNTER
Patient was scheduled with Dr. María Fontenot for a cortisone injection on 10/26/2023 for her left knee. Patient is currently undergoing treatment with Dr. Ben Jaime and is scheduled for a L Knee TKA in January. Patient needs to be scheduled with Dr. Ben Jaime for injection. Patient has never been seen by Dr. María Fontenot.

## 2023-11-03 ENCOUNTER — TELEPHONE (OUTPATIENT)
Dept: OBGYN CLINIC | Facility: MEDICAL CENTER | Age: 73
End: 2023-11-03

## 2023-11-03 NOTE — TELEPHONE ENCOUNTER
Pt called me back and had questions in regards to PAT's, advising she did not receive referrals for testing. I advised pt she did not need scripts and could just walk in.  Also confirmed when to start vitamins

## 2023-12-08 ENCOUNTER — TELEPHONE (OUTPATIENT)
Age: 73
End: 2023-12-08

## 2023-12-08 NOTE — TELEPHONE ENCOUNTER
Patient called in to check if Dr. Amisha Dan is taking in new patient has her current Dr. Chastity Jackman is retiring. So called in the practice to check and was informed Dr. Amisha Dan will be retiring in end of march informed the same to patient. However she requested if any other providers with the practice can take in new patients did confirm to her Dr. Dylan Lockhart will be seeing new patient. Thanks.

## 2023-12-11 ENCOUNTER — TELEPHONE (OUTPATIENT)
Dept: OBGYN CLINIC | Facility: HOSPITAL | Age: 73
End: 2023-12-11

## 2023-12-11 ENCOUNTER — PREP FOR PROCEDURE (OUTPATIENT)
Dept: OBGYN CLINIC | Facility: HOSPITAL | Age: 73
End: 2023-12-11

## 2023-12-11 NOTE — TELEPHONE ENCOUNTER
Spoke to patient today, we were going to complete her Preoperative elective admission assessment. She reports she was Covid + on Friday, 12/8 and was taking Plaxlovid. She is aware I will notify the Anesthesia team [facility]) and surgeon for current recommendations.

## 2023-12-11 NOTE — TELEPHONE ENCOUNTER
Spoke to patient, we discussed Kurt Roman MD recommendations: We should follow network rec's for Covid, patient will not have GA as case is typically done under spinal with blocks so I would delay for 2 weeks post symptom resolution      Preoperative Elective Admission Assessment- spoke to patient     Living Situation:    Who does pt live with: spouse (has dementia)   What kind of home: multi-level  How do they enter the home: front  How many levels in home: 2 level home    # of steps to enter home: 1 to enter, then first floor master living. Bathroom with a step in tub, with seat. Plans to stay at sisters home postoperatively, ranch home with 1 step to enter and one level living. She has a step in tub     First Floor Setup:   Is there a bathroom: Yes  Where would pt sleep: bed     DME: [employer]. PROVIDED [facility] NUMBER TO CONTACT: [phone]     We discussed clearing pathways in the home and making sure there is accessibly to use the walker, for example, removing throw rugs. Patient's Current Level of Function: Ambulates: Independently and ADLs: Independent    Post-op Caregiver: relative, Sister   Currently receive any HHC/aides/community supports: No     Post-op Transport: relative  To/from hospital: relative  To/from PT 2-3x/week: relative  Uses community transport now: No     Outpatient Physical Therapy Site:  Site: [facility]  pre and post-op appts scheduled? Yes     Medication Management: self  Preferred Pharmacy for Post-op Medications: [id number] [place], [place] - [address] [[id number]]   Blood Management Vitamin Regimen:  Pt educated on preoperative vitamins and starting 30 days before surgery   Post-op anticoagulant: Pt educated on Lovenox script being at pharmacy, advised to  and this is for AFTER surgery use only.        DC Plan: Pt plans to be discharged home    Barriers to DC identified preoperatively: none identified    BMI: 32.32    Patient Education:  Pt educated on post-op pain, early mobilization (POD0), LOS goals, OP PT goals, and preoperative bathing. Patient educated that our goal is to appropriately discharge patient based off their post-op function while striving to maintain maximal independence. The goal is to discharge patient to home and for them to attend outpatient physical therapy.     Assigned to care team? Yes

## 2023-12-13 LAB
DME PARACHUTE DELIVERY DATE ACTUAL: NORMAL
DME PARACHUTE DELIVERY DATE REQUESTED: NORMAL
DME PARACHUTE ITEM DESCRIPTION: NORMAL
DME PARACHUTE ORDER STATUS: NORMAL
DME PARACHUTE SUPPLIER NAME: NORMAL
DME PARACHUTE SUPPLIER PHONE: NORMAL

## 2023-12-14 ENCOUNTER — APPOINTMENT (OUTPATIENT)
Dept: LAB | Facility: MEDICAL CENTER | Age: 73
End: 2023-12-14
Payer: MEDICARE

## 2023-12-14 ENCOUNTER — LAB REQUISITION (OUTPATIENT)
Dept: LAB | Facility: HOSPITAL | Age: 73
End: 2023-12-14
Payer: MEDICARE

## 2023-12-14 DIAGNOSIS — M17.12 UNILATERAL PRIMARY OSTEOARTHRITIS, LEFT KNEE: ICD-10-CM

## 2023-12-14 DIAGNOSIS — M17.12 PRIMARY OSTEOARTHRITIS OF LEFT KNEE: ICD-10-CM

## 2023-12-14 LAB
ABO GROUP BLD: NORMAL
ALBUMIN SERPL BCP-MCNC: 3.7 G/DL (ref 3.5–5)
ALP SERPL-CCNC: 105 U/L (ref 34–104)
ALT SERPL W P-5'-P-CCNC: 26 U/L (ref 7–52)
ANION GAP SERPL CALCULATED.3IONS-SCNC: 8 MMOL/L
APTT PPP: 25 SECONDS (ref 23–37)
AST SERPL W P-5'-P-CCNC: 22 U/L (ref 13–39)
BASOPHILS # BLD AUTO: 0.03 THOUSANDS/ÂΜL (ref 0–0.1)
BASOPHILS NFR BLD AUTO: 0 % (ref 0–1)
BILIRUB SERPL-MCNC: 0.38 MG/DL (ref 0.2–1)
BLD GP AB SCN SERPL QL: NEGATIVE
BUN SERPL-MCNC: 13 MG/DL (ref 5–25)
CALCIUM SERPL-MCNC: 8.9 MG/DL (ref 8.4–10.2)
CHLORIDE SERPL-SCNC: 105 MMOL/L (ref 96–108)
CO2 SERPL-SCNC: 27 MMOL/L (ref 21–32)
CREAT SERPL-MCNC: 0.7 MG/DL (ref 0.6–1.3)
EOSINOPHIL # BLD AUTO: 0.08 THOUSAND/ÂΜL (ref 0–0.61)
EOSINOPHIL NFR BLD AUTO: 1 % (ref 0–6)
ERYTHROCYTE [DISTWIDTH] IN BLOOD BY AUTOMATED COUNT: 13.8 % (ref 11.6–15.1)
EST. AVERAGE GLUCOSE BLD GHB EST-MCNC: 140 MG/DL
GFR SERPL CREATININE-BSD FRML MDRD: 86 ML/MIN/1.73SQ M
GLUCOSE SERPL-MCNC: 85 MG/DL (ref 65–140)
HBA1C MFR BLD: 6.5 %
HCT VFR BLD AUTO: 46.1 % (ref 34.8–46.1)
HGB BLD-MCNC: 14.8 G/DL (ref 11.5–15.4)
IMM GRANULOCYTES # BLD AUTO: 0.04 THOUSAND/UL (ref 0–0.2)
IMM GRANULOCYTES NFR BLD AUTO: 1 % (ref 0–2)
INR PPP: 0.87 (ref 0.84–1.19)
LYMPHOCYTES # BLD AUTO: 2.51 THOUSANDS/ÂΜL (ref 0.6–4.47)
LYMPHOCYTES NFR BLD AUTO: 29 % (ref 14–44)
MCH RBC QN AUTO: 29.2 PG (ref 26.8–34.3)
MCHC RBC AUTO-ENTMCNC: 32.1 G/DL (ref 31.4–37.4)
MCV RBC AUTO: 91 FL (ref 82–98)
MONOCYTES # BLD AUTO: 0.75 THOUSAND/ÂΜL (ref 0.17–1.22)
MONOCYTES NFR BLD AUTO: 9 % (ref 4–12)
NEUTROPHILS # BLD AUTO: 5.33 THOUSANDS/ÂΜL (ref 1.85–7.62)
NEUTS SEG NFR BLD AUTO: 60 % (ref 43–75)
NRBC BLD AUTO-RTO: 0 /100 WBCS
PLATELET # BLD AUTO: 264 THOUSANDS/UL (ref 149–390)
PMV BLD AUTO: 10.9 FL (ref 8.9–12.7)
POTASSIUM SERPL-SCNC: 4.1 MMOL/L (ref 3.5–5.3)
PROT SERPL-MCNC: 6.5 G/DL (ref 6.4–8.4)
PROTHROMBIN TIME: 11.8 SECONDS (ref 11.6–14.5)
RBC # BLD AUTO: 5.06 MILLION/UL (ref 3.81–5.12)
RH BLD: POSITIVE
SODIUM SERPL-SCNC: 140 MMOL/L (ref 135–147)
SPECIMEN EXPIRATION DATE: NORMAL
WBC # BLD AUTO: 8.74 THOUSAND/UL (ref 4.31–10.16)

## 2023-12-14 PROCEDURE — 86850 RBC ANTIBODY SCREEN: CPT | Performed by: ORTHOPAEDIC SURGERY

## 2023-12-14 PROCEDURE — 86900 BLOOD TYPING SEROLOGIC ABO: CPT | Performed by: ORTHOPAEDIC SURGERY

## 2023-12-14 PROCEDURE — 83036 HEMOGLOBIN GLYCOSYLATED A1C: CPT

## 2023-12-14 PROCEDURE — 36415 COLL VENOUS BLD VENIPUNCTURE: CPT

## 2023-12-14 PROCEDURE — 85730 THROMBOPLASTIN TIME PARTIAL: CPT

## 2023-12-14 PROCEDURE — 85025 COMPLETE CBC W/AUTO DIFF WBC: CPT

## 2023-12-14 PROCEDURE — 85610 PROTHROMBIN TIME: CPT

## 2023-12-14 PROCEDURE — 86901 BLOOD TYPING SEROLOGIC RH(D): CPT | Performed by: ORTHOPAEDIC SURGERY

## 2023-12-14 PROCEDURE — 80053 COMPREHEN METABOLIC PANEL: CPT

## 2023-12-18 ENCOUNTER — OFFICE VISIT (OUTPATIENT)
Dept: LAB | Age: 73
End: 2023-12-18
Payer: MEDICARE

## 2023-12-18 DIAGNOSIS — Z01.818 PRE-OP TESTING: ICD-10-CM

## 2023-12-18 PROCEDURE — 93005 ELECTROCARDIOGRAM TRACING: CPT

## 2023-12-19 ENCOUNTER — TELEPHONE (OUTPATIENT)
Dept: OBGYN CLINIC | Facility: MEDICAL CENTER | Age: 73
End: 2023-12-19

## 2023-12-19 LAB
ATRIAL RATE: 63 BPM
P AXIS: 54 DEGREES
PR INTERVAL: 138 MS
QRS AXIS: 0 DEGREES
QRSD INTERVAL: 86 MS
QT INTERVAL: 404 MS
QTC INTERVAL: 413 MS
T WAVE AXIS: 30 DEGREES
VENTRICULAR RATE: 63 BPM

## 2024-01-02 PROBLEM — M17.12 PRIMARY OSTEOARTHRITIS OF LEFT KNEE: Status: ACTIVE | Noted: 2024-01-02

## 2024-01-02 PROBLEM — I10 ESSENTIAL HYPERTENSION: Status: ACTIVE | Noted: 2024-01-02

## 2024-01-02 NOTE — PROGRESS NOTES
Internal Medicine Pre-Operative Evaluation:     Reason for Visit: Pre-operative Evaluation for Risk Stratification and Optimization    Patient ID: Carina Bang is a 73 y.o. female.     Surgery: Arthroplasty of left knee  Referring Provider: Dr Diez      Recommendations to Proceed withSurgery    Patient is considered to be Low risk for Medium risk procedure.     After evaluation and discussion with patient with emphasis that all surgery has some degree of inherent risk it is determined this procedure is of acceptable risk  medically.    Patient may proceed with planned procedure      Assessment    Pre-operative Medical Evaluation for planned surgery  Recommendations as listed in PLAN section below  Contact surgical nurse  navigator with any questions regarding preoperative plan or schedule.      Problem List Items Addressed This Visit          Unprioritized    Hypothyroidism     Continue current thyroid supplement           Hypercholesterolemia     Continue low cholesterol diet and statin therapy           Primary osteoarthritis of left knee     Failed outpatient conservative measures  Elected to undergo total joint arthroplasty           Essential hypertension     Hold lisinopril morning of surgery to lessen risk of acute kidney injury  Continue metoprolol through perioperative phase          Other Visit Diagnoses       Preop exam for internal medicine    -  Primary                 Plan:     1. Further preoperative workup as follows:   - none no further testing required may proceed with surgery    2. Medication Management/Recommendations:   - hold ACE / ARB morning of surgery unless given other instructions by your provider  - avoid use of NSAID such as motrin, advil, aleve for 7 days prior to surgery  - hold all OTC herbal or nutritional supplements 7 days before surgery    3. Patient requires further consultation with:   No Consults Required    4. Discharge Planning / Barriers to Discharge  none  "identified - patients has post discharge therapy plan in place, transportation arranged for discharge day, adequate family support at home to assist with discharge to home.        Subjective:           History of Present Illness:     Carina Bang is a 73 y.o. female who presents to the office today for a preoperative consultation at the request of surgeon. The patient understands this is an elective procedure and not emergent. They are electing to undergo planned procedure with an understanding that all surgery has inherent risk. They have worked with their surgeon and failed conservative treatment measures. Today they present for preoperative risk assessment and recommendations for optimization in preparation for surgery.    Pt seen in surgical optimization center for upcoming proposed surgery. They have failed previous conservative measures and have elected surgical intervention.     Pt meets presurgical lab and BMI optimization goals.    Upon interview questioning patient is able to perform greater than 4 METs workload in daily life without any reported cardio-pulmonary symptoms.        ROS: No TIA's or unusual headaches, no dysphagia.  No prolonged cough. No dyspnea or chest pain on exertion.  No abdominal pain, change in bowel habits, black or bloody stools.  No urinary tract or BPH symptoms.  Positive reported pain in arthritic joint. Positive difficulty with gait. No skin rashes or issues.      Objective:    /80   Ht 4' 11\" (1.499 m)   Wt 72.9 kg (160 lb 12.8 oz)   BMI 32.48 kg/m²       General Appearance: no distress, conversive  HEENT: PERRLA, conjuctiva normal; oropharynx clear; mucous membranes moist;   Neck:  Supple, no lymphadenopathy or thyromegaly  Lungs: breath sounds normal, normal respiratory effort, no retractions, expiratory effort normal  CV: normal heart sounds S1/S2, PMI normal   ABD: soft non tender, no masses , no hepatic or splenomegaly  EXT: DP pulses intact, no lymphadenopathy, " no edema  Skin: normal turgor, normal texture, no rash  Psych: affect normal, mood normal  Neuro: AAOx3        The following portions of the patient's history were reviewed and updated as appropriate: allergies, current medications, past family history, past medical history, past social history, past surgical history and problem list.     Past History:       Past Medical History:   Diagnosis Date   • Asymptomatic menopausal state    • Breast lump    • Hyperlipemia    • Pap smear abnormality of cervix with ASCUS favoring benign    • Plantar fasciitis     Past Surgical History:   Procedure Laterality Date   • CATARACT EXTRACTION     •  SECTION     • COLONOSCOPY            Social History     Tobacco Use   • Smoking status: Never   • Smokeless tobacco: Never   Substance Use Topics   • Alcohol use: Yes     Comment: remote social alcohol use   • Drug use: No     Family History   Problem Relation Age of Onset   • Breast cancer Sister         Diagnosed in her 40s   • No Known Problems Mother    • No Known Problems Father    • No Known Problems Maternal Grandmother    • No Known Problems Maternal Grandfather    • No Known Problems Paternal Grandmother    • No Known Problems Paternal Grandfather    • No Known Problems Son    • No Known Problems Son           Allergies:     Allergies   Allergen Reactions   • Penicillins         Current Medications:     Current Outpatient Medications   Medication Instructions   • ascorbic acid (VITAMIN C) 500 mg, Oral, 2 times daily, Start 30 days prior to surgery.   • Aspirin Buf,CaCarb-MgCarb-MgO, 81 MG TABS Oral   • atorvastatin (LIPITOR) 20 mg tablet No dose, route, or frequency recorded.   • cholecalciferol (VITAMIN D3) 1,000 units tablet 1 tablet, Oral, Daily   • enoxaparin (LOVENOX) 40 mg, Subcutaneous, Daily, To start post op   • ferrous sulfate 324 mg, Oral, Daily before breakfast, Start 30 days prior to surgery.   • folic acid (FOLVITE) 1 mg, Oral, Daily, Start 30 days prior  "to surgery.   • levothyroxine 25 mcg tablet No dose, route, or frequency recorded.   • lisinopril (ZESTRIL) 10 mg tablet No dose, route, or frequency recorded.   • metoprolol succinate (TOPROL-XL) 50 mg 24 hr tablet No dose, route, or frequency recorded.           PRE-OP WORKSHEET DATA    Assessment of Pre-Operative Risks     MLJ Quality Hard Stops:  BMI (<40) : Estimated body mass index is 32.48 kg/m² as calculated from the following:    Height as of this encounter: 4' 11\" (1.499 m).    Weight as of this encounter: 72.9 kg (160 lb 12.8 oz).    Hgb ( >11):   Lab Results   Component Value Date    HGB 14.8 12/14/2023     HbA1c (<7.5) :   Lab Results   Component Value Date    HGBA1C 6.5 (H) 12/14/2023     GFR (>60) (Less then 45 = Nephrology consult):  CrCl cannot be calculated (Patient's most recent lab result is older than the maximum 7 days allowed.).      Active Decompensated Chronic Conditions which would delay surgery  No acutely decompensated medical issues such as recent CVA, MI, new onset arrhythmia, severe aortic stenosis, CHF, uncontrolled COPD       Exercise Capacity: (if less the 4 mets consider functional assessment via cardiac stress testing or consultation)    Able to walk 2 blocks without symptoms?: Yes  Able to walk 1 flights without symptoms?: Yes    Assessment of intra and post operative respiratory, hemodynamic and thrombotic risks     Prior Anesthesia Reactions: No     Personal history of venous thromboembolic disease? No    History of steroid use > 5 mg for >2 weeks within last year? No      The patient's risk factors for cardiac complications include :  none    Carina Bang has an IN HOSPITAL cardiac risk of RCI RISK CLASS I (0 risk factors, risk of major cardiac compl. appr. 0.5%) based on RCRI calculator    Cardiac Risk Estimation: per the Revised Cardiac Risk Index (Circ. 100:1043, 1999),        Pre-Op Data Reviewed:       Laboratory Results: I have personally reviewed the pertinent " laboratory results/reports     EKG:I have personally reviewed pertinent reports.  . I personally reviewed and interpreted available tracings in the electronic medical record    Normal sinus rhythm  Low voltage QRS  Borderline ECG  When compared with ECG of 19-JUN-1991 08:39,  Previous ECG has undetermined rhythm, needs review  Confirmed by Raymundo Brothers ([id number]) on 12/19/2023 8:55:29 AM    OLD RECORDS: reviewed old records in the chart review section if EHR on day of visit.    Previous cardiopulmonary studies within the past year:  Echocardiogram: no  Cardiac Catheterization: no  Stress Test: no      Time of visit including pre-visit chart review, visit and post-visit coordination of plan and care , review of pre-surgical lab work, preparation and time spent documenting note in electronic medical record, time spent face-to-face in physical examination answering patient questions by care team 45 minutes             [facility]

## 2024-01-02 NOTE — ASSESSMENT & PLAN NOTE
Hold lisinopril morning of surgery to lessen risk of acute kidney injury  Continue metoprolol through perioperative phase   Problem: Perioperative Period (Adult)  Goal: Signs and Symptoms of Listed Potential Problems Will be Absent or Manageable (Perioperative Period)  Outcome: Outcome(s) achieved Date Met:  11/03/17 11/03/17 7330   Perioperative Period   Problems Assessed (Perioperative Period) all   Problems Present (Perioperative Period) none

## 2024-01-02 NOTE — PATIENT INSTRUCTIONS
Contact surgical nurse  navigator with any questions regarding preoperative plan or schedule.  Stop all over the counter supplements, herbal, naturopathic  medications for 1 week prior to surgery UNLESS prescribed by your surgeon  Hold NSAIDS (i.e. advil, alleve, motrin, ibuprofen, celebrex) minimum 7 days prior to surgery  Hold Asprin minimum 7 days prior to surgery  Recommend using Tylenol ( acetaminophen ) 500mg every eight hours during the first week post discharge in conjunction with any additional pain medicine prescribed by your surgeon  Use bowel medicines prescribed by your surgeon ( colace) daily post op during the first 1-2 weeks to avoid post operative constipation issues  Call [phone] with any post discharge concerns or medical issues  The morning of surgery take only the following medication with small sip of water: Levothyroxine, metoprolol  Hold all other medications morning of surgery including lisinopril

## 2024-01-09 ENCOUNTER — CONSULT (OUTPATIENT)
Dept: ANESTHESIOLOGY | Facility: CLINIC | Age: 74
End: 2024-01-09
Payer: MEDICARE

## 2024-01-09 ENCOUNTER — OFFICE VISIT (OUTPATIENT)
Age: 74
End: 2024-01-09
Payer: MEDICARE

## 2024-01-09 VITALS
DIASTOLIC BLOOD PRESSURE: 80 MMHG | HEIGHT: 59 IN | WEIGHT: 160.8 LBS | BODY MASS INDEX: 32.42 KG/M2 | SYSTOLIC BLOOD PRESSURE: 124 MMHG

## 2024-01-09 DIAGNOSIS — Z01.89 ENCOUNTER FOR GERIATRIC ASSESSMENT: ICD-10-CM

## 2024-01-09 DIAGNOSIS — M17.12 PRIMARY OSTEOARTHRITIS OF LEFT KNEE: ICD-10-CM

## 2024-01-09 DIAGNOSIS — I10 ESSENTIAL HYPERTENSION: ICD-10-CM

## 2024-01-09 DIAGNOSIS — E78.00 HYPERCHOLESTEROLEMIA: ICD-10-CM

## 2024-01-09 DIAGNOSIS — E03.9 HYPOTHYROIDISM, UNSPECIFIED TYPE: ICD-10-CM

## 2024-01-09 DIAGNOSIS — Z01.818 PRE-OP EVALUATION: ICD-10-CM

## 2024-01-09 DIAGNOSIS — Z01.818 PREOP EXAM FOR INTERNAL MEDICINE: Primary | ICD-10-CM

## 2024-01-09 DIAGNOSIS — M17.12 PRIMARY OSTEOARTHRITIS OF LEFT KNEE: Primary | ICD-10-CM

## 2024-01-09 PROCEDURE — 99213 OFFICE O/P EST LOW 20 MIN: CPT | Performed by: NURSE PRACTITIONER

## 2024-01-09 PROCEDURE — 99215 OFFICE O/P EST HI 40 MIN: CPT | Performed by: INTERNAL MEDICINE

## 2024-01-09 NOTE — PROGRESS NOTES
See Geriatric Assessment below...  Cognitive Assessment:   CAM:   TUG <15 sec:  Falls (last 6 months):  no  Hand  score:13.33  -Attempt 1:14  -Attempt 2:14  -Attempt 3:12  Dominik Total Score: 21  PHQ- 9 Depression Scale: 0  Nutrition Assessment Score: 13  METS: 8.33  Incentive Spirometry Level:   Health goals:  -What are your overall health goals? Losing weight    -What brings you strength? sister    -What activities are important to you? Cooking and baking     As always we discussed having your BEST surgery, and BEST recovery.  Surgery goals reviewed today.      Breathing exercises   Patient was encouraged to begin lung exercises today.  This could be accomplished through deep breathing and cough exercises.  Patient was taught how to use an incentive spirometer.  Return demonstration provided.    Eating/nutrition   Encouraged patient to increase oral protein intake prior to surgery.  This can be accomplished by consuming chicken, fish, tuna fish, cottage cheese, cheese, eggs, Greek yogurt, and protein shakes as needed.  I encouraged use of protein shakes such ENLIVE.  I also recommended making your own protein shakes with protein powder.   Sleep/Stress management  Patient was encouraged to rest their body prior to surgery.  Encouraged attempting to get 8 hours of sleep at night.  Avoid stress.  Avoid sick contacts.  Encouraged to find a relaxing hobby such as reading, meditation, listening to music.  Training exercises  Patient was encouraged to remain active as possible.  Today bilateral lower extremity generic exercises were taught for muscle strengthening and balance.  All exercises to be done sitting down.

## 2024-01-09 NOTE — PROGRESS NOTES
[facility]  CONSULT: GERIATRIC SURGERY   Assessment/Plan:  73-year-old female referred to [facility] for presurgery geriatric screening.  This SOC appointment is strictly for a geriatric assessment 2.2 advanced age.  Patient was previously seen in SOC by Dr. Odell and his team for medical clearance. Please refer to his note for medical history.  Consult concerns: age   Patient is scheduled on   Case: [id number]/Time: 01/18/24 0845Procedure: ARTHROPLASTY KNEE TOTAL W ROBOT (Left: Knee)Anesthesia type: ChoiceDiagnosis: Primary osteoarthritis of left knee [M17.12]Pre-op diagnosis: Primary osteoarthritis of left knee [M17.12]Location: [facility] OR ROOM 04 / [facility] ExpressSurgeons: Lisandro Diez MD   No problem-specific Assessment & Plan notes found for this encounter.       Last anesthesia  NO CONCERNS   Problem List Items Addressed This Visit       Primary osteoarthritis of left knee - Primary  Seen for surgical optimization   seen for geriatric assessment  CORRIE risk low  SSI risk low  SOC anemia needs-no  Received medical clearance today  METS 8  Started on best program  BEST   Breathing- instructed to exercise lungs prior to surgery via IS  Eat- discussed increasing protein intake prior to surgery   Sleep/stress- encouraged 8-10 sleep @ night, stress reduction, avoid sick contacts and handwashing   Train- encouraged to remain active              Encounter for geriatric assessment  Seen for geriatric assessment  NO GERIATRIC CONCERNS   NO COGNITIVE CONCERNS   See Geriatric Assessment below...  Cognitive Assessment: 5   TUG <15 sec:YES  Falls (last 6 months):  no  Hand  score:13.33  -Attempt 1:14  -Attempt 2:14  -Attempt 3:12  Dominik Total Score: 21  PHQ- 9 Depression Scale: 0  Nutrition Assessment Score: 13  METS: 8.33  Incentive Spirometry Level:   Health goals:  -What are your overall health goals? Losing weight     -What brings you strength? sister      -What activities are important to you? Cooking and baking       Other Visit Diagnoses       Pre-op evaluation                  Subjective:      Patient ID: Carina Bang is a 73 y.o. female  Who was referred to [facility] for presurgery geriatric screening secondary to age, having surgery, and receiving anesthetics.  Patient has had an ongoing history of left knee pain and is electing to have a left knee replacement.  This SOC visit is strictly for her geriatric assessment due to her age.    I met patient in the SOC. She arrived alone.  Walks independently.  No cane and no walker use.  She drove to today's appointment.  Lives at home with her .  Unfortunately her  has dementia and it has progressed.  She is his main caregiver.  Her and her family are looking for placement option for him.  Patient has help for surgery coverage.  There are no cognitive concerns identified today.  There are no geriatric concerns identified today     Surgical optimization   Silviano risk - low   Ssi risk low   [facility] anemia - no needs     She received [facility] clearance today.  Admits to being in well health today     As always we started our best protocol  As always we discussed having your BEST surgery, and BEST recovery.  Surgery goals reviewed today.      Breathing exercises   Patient was encouraged to begin lung exercises today.  This could be accomplished through deep breathing and cough exercises.  Patient was taught how to use an incentive spirometer.  Return demonstration provided.    Eating/nutrition   Encouraged patient to increase oral protein intake prior to surgery.  This can be accomplished by consuming chicken, fish, tuna fish, cottage cheese, cheese, eggs, Greek yogurt, and protein shakes as needed.  I encouraged use of protein shakes such ENLIVE.  I also recommended making your own protein shakes with protein powder.   Sleep/Stress management  Patient was encouraged to rest their body prior to surgery.  Encouraged  attempting to get 8 hours of sleep at night.  Avoid stress.  Avoid sick contacts.  Encouraged to find a relaxing hobby such as reading, meditation, listening to music.  Training exercises  Patient was encouraged to remain active as possible.  Today bilateral lower extremity generic exercises were taught for muscle strengthening and balance.  All exercises to be done sitting down.       The following portions of the patient's history were reviewed and updated as appropriate: allergies, current medications, past family history, past medical history, past social history, past surgical history, and problem list.    Review of Systems   All other systems reviewed and are negative.        Objective:      There were no vitals taken for this visit.         Physical Exam  Pulmonary:      Effort: Pulmonary effort is normal.   Neurological:      General: No focal deficit present.      Mental Status: She is alert and oriented to person, place, and time. Mental status is at baseline.   Psychiatric:         Mood and Affect: Mood normal.         Behavior: Behavior normal.         Thought Content: Thought content normal.         Judgment: Judgment normal.

## 2024-01-10 ENCOUNTER — EVALUATION (OUTPATIENT)
Dept: PHYSICAL THERAPY | Facility: MEDICAL CENTER | Age: 74
End: 2024-01-10
Payer: MEDICARE

## 2024-01-10 DIAGNOSIS — M17.12 PRIMARY OSTEOARTHRITIS OF LEFT KNEE: ICD-10-CM

## 2024-01-10 PROCEDURE — 97161 PT EVAL LOW COMPLEX 20 MIN: CPT | Performed by: PHYSICAL THERAPIST

## 2024-01-10 NOTE — PROGRESS NOTES
PT Evaluation     Today's date: 1/10/2024  Patient name: Carina Bang  : 1950  MRN: [id number]  Referring provider: Lillian Starr,*  Dx:   Encounter Diagnosis     ICD-10-CM    1. Primary osteoarthritis of left knee  M17.12 Ambulatory referral to Physical Therapy                     Assessment  Assessment details: Carina Bang is a 73 y.o. female who presents with Primary osteoarthritis of left knee.  Patient presents alert and oriented with the above impairments. . Carina will benefit from PT to addres deficits in order to maximize and return to prior level of function.  No further referral appears necessary at this time based upon examination results.  Prognosis is good given HEP compliance. Please contact me if you have any questions or recommendations.     Impairments: abnormal gait, abnormal or restricted ROM, abnormal movement, activity intolerance, impaired physical strength, lacks appropriate home exercise program, pain with function and weight-bearing intolerance  Understanding of Dx/Px/POC: good   Prognosis: good    Goals  Short Term Goals:   1.  Pain decreased 2 subjective ratings in 4 weeks  2.  Increased knee flexion 10* in 4 weeks  3.  Increased knee extension 5* in 4 weeks  4.  Strength improved 1/2 grade in 4 weeks    Long Term Goals:  1.  Ambulating without AD 8 weeks in 8 weeks  2.  Stair climbing with reciprocally in 8 weeks  3.  Independent with HEP in 8 weeks    Plan  Patient would benefit from: skilled physical therapy  Planned therapy interventions: abdominal trunk stabilization, IASTM, manual therapy, neuromuscular re-education, patient education, strengthening, home exercise program, therapeutic exercise, stretching, flexibility, functional ROM exercises and gait training  Frequency: 2x week  Duration in weeks: 12  Treatment plan discussed with: patient        Subjective Evaluation    History of Present Illness  Mechanism of injury: Pt reports onset of L knee pain about a  year ago.  She did receive injections which provided temporary relief.  She is now scheduled for TKR on Jan 18.  She presents today for pre-op visit w/ reports of constant pain that is most severe w/ sit to stand, sleeping, lying down, stair climbing.  She also c/o weakness in LLE.  She c/o sleep disturbance.    She does live in 2 story home, but she does not use 2nd floor.  Bedroom and bathroom on first floor w/ 1 step to enter the house.  She will have assistance at home post-operatively.    Patient Goals  Patient goals for therapy: decreased pain, increased motion, increased strength and independence with ADLs/IADLs    Pain  At best pain rating: 3  At worst pain rating: 10          Objective     Active Range of Motion   Left Knee   Flexion: 120 degrees   Extension: 0 degrees     Right Knee   Flexion: 130 degrees   Extension: 0 degrees     Strength/Myotome Testing     Left Hip   Planes of Motion   Flexion: 4-  Abduction: 4+    Right Hip   Planes of Motion   Flexion: 5  Abduction: 5    Left Knee   Flexion: 4-  Extension: 4    Right Knee   Flexion: 5      Flowsheet Rows      Flowsheet Row Most Recent Value   PT/OT G-Codes    Assessment Type Evaluation   G code set Other PT/OT Primary   Other PT Primary Current Status () CK   Other PT Primary Goal Status () CJ               Precautions: none      Manuals                                                                 Neuro Re-Ed                                                                                                        Ther Ex                                                                                                                     Ther Activity                                       Gait Training                                       Modalities                                          Eval/Re-Eval POC Expires Auth #/ Referral # Total Visits Start Date Expiration Date Extension Info Visits Limitation   1/10 4/10 CHERI/GERBER                                                                    1 2 3 4 5 6   1/10        7 8 9 10 11 12           13 14 15 16 17 18           19 20 21 22 23 24           25 26 27 28 29 30

## 2024-01-11 RX ORDER — GABAPENTIN 100 MG/1
CAPSULE ORAL
COMMUNITY
Start: 2023-11-18

## 2024-01-11 NOTE — PRE-PROCEDURE INSTRUCTIONS
Pre-Surgery Instructions:   Medication Instructions    ascorbic acid (VITAMIN C) 500 MG tablet Hold day of surgery.    atorvastatin (LIPITOR) 20 mg tablet Take night before surgery    cholecalciferol (VITAMIN D3) 1,000 units tablet Stop taking 7 days prior to surgery.    ferrous sulfate 324 (65 Fe) mg Hold day of surgery.    folic acid (FOLVITE) 1 mg tablet Hold day of surgery.    gabapentin (NEURONTIN) 100 mg capsule Uses PRN- OK to take day of surgery    levothyroxine 25 mcg tablet Take day of surgery.    lisinopril (ZESTRIL) 10 mg tablet Hold day of surgery.    metoprolol succinate (TOPROL-XL) 50 mg 24 hr tablet Take day of surgery.   Medication instructions for day surgery reviewed. Please use only a sip of water to take your instructed medications. Avoid all over the counter vitamins, supplements and NSAIDS for one week prior to surgery per anesthesia guidelines. Tylenol is ok to take as needed.     You will receive a call one business day prior to surgery with an arrival time and hospital directions. If your surgery is scheduled on a Monday, the hospital will be calling you on the Friday prior to your surgery. If you have not heard from anyone by 8pm, please call the hospital supervisor through the hospital  at [phone]. (Earnest [phone]).    Do not eat or drink anything after midnight the night before your surgery, including candy, mints, lifesavers, or chewing gum. Do not drink alcohol 24hrs before your surgery. Try not to smoke at least 24hrs before your surgery.       Follow the pre surgery showering instructions as listed in the “My Surgical Experience Booklet” or otherwise provided by your surgeon's office. Do not use a blade to shave the surgical area 1 week before surgery. It is okay to use a clean electric clippers up to 24 hours before surgery. Do not apply any lotions, creams, including makeup, cologne, deodorant, or perfumes after showering on the day of your surgery. Do not use  dry shampoo, hair spray, hair gel, or any type of hair products.     No contact lenses, eye make-up, or artificial eyelashes. Remove nail polish, including gel polish, and any artificial, gel, or acrylic nails if possible. Remove all jewelry including rings and body piercing jewelry.     Wear causal clothing that is easy to take on and off. Consider your type of surgery.    Keep any valuables, jewelry, piercings at home. Please bring any specially ordered equipment (sling, braces) if indicated.    Arrange for a responsible person to drive you to and from the hospital on the day of your surgery. Visitor Guidelines discussed.     Call the surgeon's office with any new illnesses, exposures, or additional questions prior to surgery.    Please reference your “My Surgical Experience Booklet” for additional information to prepare for your upcoming surgery. Ortho class completed all questions answered. Bring walker dos

## 2024-01-12 ENCOUNTER — ANESTHESIA EVENT (OUTPATIENT)
Age: 74
End: 2024-01-12

## 2024-01-12 ENCOUNTER — TELEPHONE (OUTPATIENT)
Dept: OBGYN CLINIC | Facility: HOSPITAL | Age: 74
End: 2024-01-12

## 2024-01-12 ENCOUNTER — ANESTHESIA (OUTPATIENT)
Age: 74
End: 2024-01-12

## 2024-01-12 NOTE — TELEPHONE ENCOUNTER
"Pt called directly, scheduled for TKA on 1.18    She states she started with a Cough last evening, with \"phlegm\" but feels \"perfectly fine\" otherwise.     She is seeing cardiology today for clearance.    She will update our team on symptoms early next week.    "

## 2024-01-17 ENCOUNTER — ANESTHESIA EVENT (OUTPATIENT)
Age: 74
End: 2024-01-17
Payer: MEDICARE

## 2024-01-17 ENCOUNTER — OFFICE VISIT (OUTPATIENT)
Dept: FAMILY MEDICINE CLINIC | Facility: MEDICAL CENTER | Age: 74
End: 2024-01-17
Payer: MEDICARE

## 2024-01-17 VITALS
TEMPERATURE: 98.3 F | BODY MASS INDEX: 32.62 KG/M2 | RESPIRATION RATE: 18 BRPM | DIASTOLIC BLOOD PRESSURE: 82 MMHG | WEIGHT: 161.8 LBS | HEART RATE: 84 BPM | SYSTOLIC BLOOD PRESSURE: 126 MMHG | OXYGEN SATURATION: 97 % | HEIGHT: 59 IN

## 2024-01-17 DIAGNOSIS — Z12.31 SCREENING MAMMOGRAM FOR BREAST CANCER: ICD-10-CM

## 2024-01-17 DIAGNOSIS — J40 BRONCHITIS: ICD-10-CM

## 2024-01-17 DIAGNOSIS — M17.12 PRIMARY OSTEOARTHRITIS OF LEFT KNEE: ICD-10-CM

## 2024-01-17 DIAGNOSIS — Z01.818 PRE-OP TESTING: Primary | ICD-10-CM

## 2024-01-17 DIAGNOSIS — Z76.89 ENCOUNTER TO ESTABLISH CARE WITH NEW DOCTOR: Primary | ICD-10-CM

## 2024-01-17 PROCEDURE — 99204 OFFICE O/P NEW MOD 45 MIN: CPT

## 2024-01-17 RX ORDER — AZITHROMYCIN 250 MG/1
TABLET, FILM COATED ORAL DAILY
Qty: 6 TABLET | Refills: 0 | Status: SHIPPED | OUTPATIENT
Start: 2024-01-17 | End: 2024-01-22

## 2024-01-17 RX ORDER — PREDNISONE 20 MG/1
20 TABLET ORAL DAILY
Qty: 5 TABLET | Refills: 0 | Status: SHIPPED | OUTPATIENT
Start: 2024-01-17 | End: 2024-01-22

## 2024-01-17 RX ORDER — FOLIC ACID 1 MG/1
1 TABLET ORAL DAILY
Qty: 30 TABLET | Refills: 0 | Status: SHIPPED | OUTPATIENT
Start: 2024-01-17 | End: 2024-02-16

## 2024-01-17 RX ORDER — FERROUS SULFATE 324(65)MG
324 TABLET, DELAYED RELEASE (ENTERIC COATED) ORAL
Qty: 30 TABLET | Refills: 0 | Status: SHIPPED | OUTPATIENT
Start: 2024-01-17 | End: 2024-02-16

## 2024-01-17 RX ORDER — ASCORBATE CALCIUM 500 MG
500 TABLET ORAL 2 TIMES DAILY
Qty: 60 TABLET | Refills: 0 | Status: SHIPPED | OUTPATIENT
Start: 2024-01-17 | End: 2024-02-16

## 2024-01-17 NOTE — PATIENT INSTRUCTIONS
Use Z-Jet as directed until course is complete.    Prednisone once daily x 5 days.    Albuterol inhaler as needed.    Return in 3 months for follow-up, sooner if needed.

## 2024-01-17 NOTE — PROGRESS NOTES
Assessment/Plan:    Z-Jet, prednisone as directed for bronchitis.  Also advised about use of albuterol inhaler for symptom relief as prescribed by cardiologist.  Mammogram due, order placed.  Briefly discussed vaccinations including influenza, COVID, pneumococcal and Shingrix.  Will further discuss at next office visit.  Return in 3 months for follow-up post left TKR, sooner if needed.     1. Encounter to establish care with new doctor  73-year-old female presents to establish care with new provider.  Past medical history includes but is not limited to hypertension, hypothyroidism, hypercholesterolemia, obesity, stress incontinence.  She follows yearly with cardiology for hypercholesterolemia and hypertension.  She is scheduled for left total knee replacement next month with Dr. Diez.  Today, she would like to address her cough and congestion symptoms.    2. Bronchitis  Use albuterol inhaler as needed as prescribed by cardiologist.  Z-Jet and prednisone as directed until course is complete.  Return if symptoms worsen or fail to improve.  - azithromycin (Zithromax) 250 mg tablet; Take 2 tablets (500 mg total) by mouth daily for 1 day, THEN 1 tablet (250 mg total) daily for 4 days.  Dispense: 6 tablet; Refill: 0  - predniSONE 20 mg tablet; Take 1 tablet (20 mg total) by mouth daily for 5 days  Dispense: 5 tablet; Refill: 0    3. Screening mammogram for breast cancer  - Mammo screening bilateral w 3d & cad; Future      Subjective:      Patient ID: Carina Bang is a 73 y.o. female.    73-year-old female presents to establish care with new provider. She is a previous patient of Dr. Kline who has since retired. Past medical history includes but is not limited to hypertension, hypercholesterolemia, hypothyroidism, obesity, left knee osteoarthritis, stress incontinence.  She follows regularly with [facility] cardiology for hypertension and hypercholesterolemia.  She is scheduled for a left total knee replacement on 2/5 with  "Dr. Diez which was pushed back to this date from original date due to current illness/cough.  She is complaining of dry cough, congestion 6 days.   Denies fever, body aches, sore throat, cp, sob.   When seen by cardiology on 1/12 for annual visit, patient was diagnosed with viral bronchitis and advised about use of albuterol as needed, inhaler sent to pharmacy by Cardiology with advisement to use only if necessary. She has not yet started using this. She was advised to go to  for further evaluation as she did not have a pcp at the time since her previous pcp retired. She has been taking some left over tessalon perles as previously prescribed by prior pcp without much relief. She has a history of bronchitis which she usually gets once per year around this time. Previously has been given steroids, inhaler and z-pack which she tells me worked well for her in the past. She has not yet been tested for covid/flu. She had covid in December, was treated with Paxlovid.           The following portions of the patient's history were reviewed and updated as appropriate: allergies, past family history, past medical history, past social history, past surgical history, and problem list.    Review of Systems   Constitutional: Negative.    HENT:  Positive for congestion.    Eyes: Negative.    Respiratory:  Positive for cough.    Cardiovascular: Negative.    Gastrointestinal: Negative.    Endocrine: Negative.    Genitourinary: Negative.    Musculoskeletal: Negative.    Skin: Negative.    Allergic/Immunologic: Negative.    Neurological: Negative.    Hematological: Negative.    Psychiatric/Behavioral: Negative.           Objective:      /82 (BP Location: Left arm, Patient Position: Sitting, Cuff Size: Large)   Pulse 84   Temp 98.3 °F (36.8 °C)   Resp 18   Ht 4' 11\" (1.499 m)   Wt 73.4 kg (161 lb 12.8 oz)   SpO2 97%   BMI 32.68 kg/m²          Physical Exam  Vitals and nursing note reviewed.   Constitutional:       " General: She is not in acute distress.     Appearance: Normal appearance. She is not ill-appearing.   HENT:      Head: Normocephalic and atraumatic.      Right Ear: Tympanic membrane, ear canal and external ear normal.      Left Ear: Tympanic membrane, ear canal and external ear normal.      Nose: Congestion present.      Right Sinus: Maxillary sinus tenderness present. No frontal sinus tenderness.      Left Sinus: Maxillary sinus tenderness present. No frontal sinus tenderness.      Mouth/Throat:      Mouth: Mucous membranes are moist.      Pharynx: Oropharynx is clear. No oropharyngeal exudate or posterior oropharyngeal erythema.   Eyes:      Conjunctiva/sclera: Conjunctivae normal.   Cardiovascular:      Rate and Rhythm: Normal rate and regular rhythm.      Pulses: Normal pulses.      Heart sounds: Normal heart sounds.   Pulmonary:      Effort: Pulmonary effort is normal. No respiratory distress.      Breath sounds: Normal breath sounds. No stridor. No wheezing, rhonchi or rales.   Musculoskeletal:         General: Normal range of motion.      Cervical back: Normal range of motion and neck supple.   Skin:     General: Skin is warm and dry.   Neurological:      General: No focal deficit present.      Mental Status: She is alert and oriented to person, place, and time. Mental status is at baseline.   Psychiatric:         Mood and Affect: Mood normal.         Behavior: Behavior normal.         Thought Content: Thought content normal.                    YA Garnica

## 2024-01-18 ENCOUNTER — ANESTHESIA (OUTPATIENT)
Age: 74
End: 2024-01-18
Payer: MEDICARE

## 2024-01-22 ENCOUNTER — APPOINTMENT (OUTPATIENT)
Dept: PHYSICAL THERAPY | Facility: MEDICAL CENTER | Age: 74
End: 2024-01-22
Payer: MEDICARE

## 2024-01-29 ENCOUNTER — LAB REQUISITION (OUTPATIENT)
Dept: LAB | Facility: HOSPITAL | Age: 74
End: 2024-01-29
Payer: MEDICARE

## 2024-01-29 ENCOUNTER — APPOINTMENT (OUTPATIENT)
Dept: LAB | Facility: MEDICAL CENTER | Age: 74
End: 2024-01-29
Payer: MEDICARE

## 2024-01-29 DIAGNOSIS — Z01.818 PRE-OP TESTING: ICD-10-CM

## 2024-01-29 DIAGNOSIS — Z01.818 ENCOUNTER FOR OTHER PREPROCEDURAL EXAMINATION: ICD-10-CM

## 2024-01-29 LAB
ABO GROUP BLD: NORMAL
ALBUMIN SERPL BCP-MCNC: 3.4 G/DL (ref 3.5–5)
ALP SERPL-CCNC: 104 U/L (ref 34–104)
ALT SERPL W P-5'-P-CCNC: 44 U/L (ref 7–52)
ANION GAP SERPL CALCULATED.3IONS-SCNC: 8 MMOL/L
APTT PPP: 27 SECONDS (ref 23–37)
AST SERPL W P-5'-P-CCNC: 32 U/L (ref 13–39)
BASOPHILS # BLD AUTO: 0.04 THOUSANDS/ÂΜL (ref 0–0.1)
BASOPHILS NFR BLD AUTO: 1 % (ref 0–1)
BILIRUB SERPL-MCNC: 0.4 MG/DL (ref 0.2–1)
BLD GP AB SCN SERPL QL: NEGATIVE
BUN SERPL-MCNC: 8 MG/DL (ref 5–25)
CALCIUM ALBUM COR SERPL-MCNC: 9.5 MG/DL (ref 8.3–10.1)
CALCIUM SERPL-MCNC: 9 MG/DL (ref 8.4–10.2)
CHLORIDE SERPL-SCNC: 105 MMOL/L (ref 96–108)
CO2 SERPL-SCNC: 28 MMOL/L (ref 21–32)
CREAT SERPL-MCNC: 0.67 MG/DL (ref 0.6–1.3)
EOSINOPHIL # BLD AUTO: 0.19 THOUSAND/ÂΜL (ref 0–0.61)
EOSINOPHIL NFR BLD AUTO: 3 % (ref 0–6)
ERYTHROCYTE [DISTWIDTH] IN BLOOD BY AUTOMATED COUNT: 14.3 % (ref 11.6–15.1)
EST. AVERAGE GLUCOSE BLD GHB EST-MCNC: 128 MG/DL
GFR SERPL CREATININE-BSD FRML MDRD: 87 ML/MIN/1.73SQ M
GLUCOSE P FAST SERPL-MCNC: 105 MG/DL (ref 65–99)
HBA1C MFR BLD: 6.1 %
HCT VFR BLD AUTO: 46.1 % (ref 34.8–46.1)
HGB BLD-MCNC: 14.3 G/DL (ref 11.5–15.4)
IMM GRANULOCYTES # BLD AUTO: 0.04 THOUSAND/UL (ref 0–0.2)
IMM GRANULOCYTES NFR BLD AUTO: 1 % (ref 0–2)
INR PPP: 0.96 (ref 0.84–1.19)
LYMPHOCYTES # BLD AUTO: 1.36 THOUSANDS/ÂΜL (ref 0.6–4.47)
LYMPHOCYTES NFR BLD AUTO: 21 % (ref 14–44)
MCH RBC QN AUTO: 28.8 PG (ref 26.8–34.3)
MCHC RBC AUTO-ENTMCNC: 31 G/DL (ref 31.4–37.4)
MCV RBC AUTO: 93 FL (ref 82–98)
MONOCYTES # BLD AUTO: 0.72 THOUSAND/ÂΜL (ref 0.17–1.22)
MONOCYTES NFR BLD AUTO: 11 % (ref 4–12)
NEUTROPHILS # BLD AUTO: 4.24 THOUSANDS/ÂΜL (ref 1.85–7.62)
NEUTS SEG NFR BLD AUTO: 63 % (ref 43–75)
NRBC BLD AUTO-RTO: 0 /100 WBCS
PLATELET # BLD AUTO: 215 THOUSANDS/UL (ref 149–390)
PMV BLD AUTO: 11.2 FL (ref 8.9–12.7)
POTASSIUM SERPL-SCNC: 4.4 MMOL/L (ref 3.5–5.3)
PROT SERPL-MCNC: 6.3 G/DL (ref 6.4–8.4)
PROTHROMBIN TIME: 12.7 SECONDS (ref 11.6–14.5)
RBC # BLD AUTO: 4.97 MILLION/UL (ref 3.81–5.12)
RH BLD: POSITIVE
SODIUM SERPL-SCNC: 141 MMOL/L (ref 135–147)
SPECIMEN EXPIRATION DATE: NORMAL
WBC # BLD AUTO: 6.59 THOUSAND/UL (ref 4.31–10.16)

## 2024-01-29 PROCEDURE — 83036 HEMOGLOBIN GLYCOSYLATED A1C: CPT

## 2024-01-29 PROCEDURE — 86850 RBC ANTIBODY SCREEN: CPT | Performed by: ORTHOPAEDIC SURGERY

## 2024-01-29 PROCEDURE — 85610 PROTHROMBIN TIME: CPT

## 2024-01-29 PROCEDURE — 80053 COMPREHEN METABOLIC PANEL: CPT

## 2024-01-29 PROCEDURE — 86901 BLOOD TYPING SEROLOGIC RH(D): CPT | Performed by: ORTHOPAEDIC SURGERY

## 2024-01-29 PROCEDURE — 36415 COLL VENOUS BLD VENIPUNCTURE: CPT

## 2024-01-29 PROCEDURE — 85730 THROMBOPLASTIN TIME PARTIAL: CPT

## 2024-01-29 PROCEDURE — 85025 COMPLETE CBC W/AUTO DIFF WBC: CPT

## 2024-01-29 PROCEDURE — 86900 BLOOD TYPING SEROLOGIC ABO: CPT | Performed by: ORTHOPAEDIC SURGERY

## 2024-01-31 ENCOUNTER — ANESTHESIA EVENT (OUTPATIENT)
Age: 74
End: 2024-01-31

## 2024-01-31 ENCOUNTER — ANESTHESIA (OUTPATIENT)
Age: 74
End: 2024-01-31

## 2024-02-03 NOTE — DISCHARGE INSTR - AVS FIRST PAGE
Discharge Instructions - Orthopedics  Carina Bang 73 y.o. female MRN: [id number]  Unit/Bed#:     Weight Bearing Status:                                           Weight Bearing as tolerated to the left lower extremity.    DVT prophylaxis:  Complete course of Lovenox as directed    Pain:  Continue analgesics as directed    Showering Instructions:   Do not shower until followup     Dressing Instructions:   Keep dressing clean, dry and intact until follow up appointment.    Driving Instructions:  No driving until cleared by Orthopaedic Surgery.    PT/OT:  Continue PT/OT on outpatient basis as directed    Appt Instructions:   If you do not have your appointment, please call the clinic at [phone]  Otherwise followup as scheduled on 2/16/2024 in [place]    Contact the office sooner if you experience any increased numbness/tingling in the extremities.      Miscellaneous:  None

## 2024-02-05 ENCOUNTER — HOSPITAL ENCOUNTER (OUTPATIENT)
Age: 74
Setting detail: OUTPATIENT SURGERY
Discharge: HOME | End: 2024-02-06
Attending: ORTHOPAEDIC SURGERY | Admitting: ORTHOPAEDIC SURGERY
Payer: MEDICARE

## 2024-02-05 DIAGNOSIS — M17.12 PRIMARY OSTEOARTHRITIS OF LEFT KNEE: Primary | ICD-10-CM

## 2024-02-05 PROCEDURE — NC001 PR NO CHARGE: Performed by: ORTHOPAEDIC SURGERY

## 2024-02-05 PROCEDURE — 27447 TOTAL KNEE ARTHROPLASTY: CPT | Performed by: ORTHOPAEDIC SURGERY

## 2024-02-05 PROCEDURE — C1776 JOINT DEVICE (IMPLANTABLE): HCPCS | Performed by: ORTHOPAEDIC SURGERY

## 2024-02-05 PROCEDURE — C1713 ANCHOR/SCREW BN/BN,TIS/BN: HCPCS | Performed by: ORTHOPAEDIC SURGERY

## 2024-02-05 PROCEDURE — 97167 OT EVAL HIGH COMPLEX 60 MIN: CPT

## 2024-02-05 PROCEDURE — C9290 INJ, BUPIVACAINE LIPOSOME: HCPCS | Performed by: ANESTHESIOLOGY

## 2024-02-05 PROCEDURE — 27447 TOTAL KNEE ARTHROPLASTY: CPT | Performed by: PHYSICIAN ASSISTANT

## 2024-02-05 PROCEDURE — 97163 PT EVAL HIGH COMPLEX 45 MIN: CPT | Performed by: PHYSICAL THERAPIST

## 2024-02-05 PROCEDURE — S2900 ROBOTIC SURGICAL SYSTEM: HCPCS | Performed by: ORTHOPAEDIC SURGERY

## 2024-02-05 PROCEDURE — 99024 POSTOP FOLLOW-UP VISIT: CPT | Performed by: PHYSICIAN ASSISTANT

## 2024-02-05 DEVICE — ATTUNE PATELLA MEDIALIZED DOME 35MM CEMENTED AOX
Type: IMPLANTABLE DEVICE | Site: KNEE | Status: FUNCTIONAL
Brand: ATTUNE

## 2024-02-05 DEVICE — ATTUNE KNEE SYSTEM TIBIAL INSERT FIXED BEARING POSTERIOR STABILIZED 3 8MM AOX
Type: IMPLANTABLE DEVICE | Site: KNEE | Status: FUNCTIONAL
Brand: ATTUNE

## 2024-02-05 DEVICE — ATTUNE KNEE SYSTEM FEMORAL POSTERIOR STABILIZED SIZE 3 LEFT CEMENTED
Type: IMPLANTABLE DEVICE | Site: KNEE | Status: FUNCTIONAL
Brand: ATTUNE

## 2024-02-05 DEVICE — SMARTSET HV HIGH VISCOSITY BONE CEMENT 40G
Type: IMPLANTABLE DEVICE | Site: KNEE | Status: FUNCTIONAL
Brand: SMARTSET

## 2024-02-05 DEVICE — ATTUNE KNEE SYSTEM TIBIAL BASE FIXED BEARING SIZE 3 CEMENTED
Type: IMPLANTABLE DEVICE | Site: KNEE | Status: FUNCTIONAL
Brand: ATTUNE

## 2024-02-05 RX ORDER — SODIUM CHLORIDE, SODIUM LACTATE, POTASSIUM CHLORIDE, CALCIUM CHLORIDE 600; 310; 30; 20 MG/100ML; MG/100ML; MG/100ML; MG/100ML
125 INJECTION, SOLUTION INTRAVENOUS CONTINUOUS
Status: DISCONTINUED | OUTPATIENT
Start: 2024-02-05 | End: 2024-02-06 | Stop reason: HOSPADM

## 2024-02-05 RX ORDER — METOPROLOL SUCCINATE 25 MG/1
50 TABLET, EXTENDED RELEASE ORAL DAILY
Status: DISCONTINUED | OUTPATIENT
Start: 2024-02-05 | End: 2024-02-06 | Stop reason: HOSPADM

## 2024-02-05 RX ORDER — METHOCARBAMOL 500 MG/1
500 TABLET, FILM COATED ORAL EVERY 6 HOURS SCHEDULED
Status: DISCONTINUED | OUTPATIENT
Start: 2024-02-05 | End: 2024-02-06 | Stop reason: HOSPADM

## 2024-02-05 RX ORDER — ONDANSETRON 2 MG/ML
4 INJECTION INTRAMUSCULAR; INTRAVENOUS ONCE AS NEEDED
Status: DISCONTINUED | OUTPATIENT
Start: 2024-02-05 | End: 2024-02-05 | Stop reason: HOSPADM

## 2024-02-05 RX ORDER — PROMETHAZINE HYDROCHLORIDE 25 MG/ML
12.5 INJECTION, SOLUTION INTRAMUSCULAR; INTRAVENOUS ONCE AS NEEDED
Status: DISCONTINUED | OUTPATIENT
Start: 2024-02-05 | End: 2024-02-05 | Stop reason: HOSPADM

## 2024-02-05 RX ORDER — GABAPENTIN 100 MG/1
100 CAPSULE ORAL EVERY 8 HOURS
Status: DISCONTINUED | OUTPATIENT
Start: 2024-02-05 | End: 2024-02-06 | Stop reason: HOSPADM

## 2024-02-05 RX ORDER — HYDRALAZINE HYDROCHLORIDE 20 MG/ML
5 INJECTION INTRAMUSCULAR; INTRAVENOUS
Status: DISCONTINUED | OUTPATIENT
Start: 2024-02-05 | End: 2024-02-05 | Stop reason: HOSPADM

## 2024-02-05 RX ORDER — IPRATROPIUM BROMIDE AND ALBUTEROL SULFATE 2.5; .5 MG/3ML; MG/3ML
3 SOLUTION RESPIRATORY (INHALATION) ONCE AS NEEDED
Status: DISCONTINUED | OUTPATIENT
Start: 2024-02-05 | End: 2024-02-06 | Stop reason: HOSPADM

## 2024-02-05 RX ORDER — ACETAMINOPHEN 325 MG/1
650 TABLET ORAL EVERY 6 HOURS PRN
Status: DISCONTINUED | OUTPATIENT
Start: 2024-02-05 | End: 2024-02-06 | Stop reason: HOSPADM

## 2024-02-05 RX ORDER — TRANEXAMIC ACID 10 MG/ML
1000 INJECTION, SOLUTION INTRAVENOUS ONCE
Status: COMPLETED | OUTPATIENT
Start: 2024-02-05 | End: 2024-02-05

## 2024-02-05 RX ORDER — ATORVASTATIN CALCIUM 20 MG/1
20 TABLET, FILM COATED ORAL
Status: DISCONTINUED | OUTPATIENT
Start: 2024-02-05 | End: 2024-02-06 | Stop reason: HOSPADM

## 2024-02-05 RX ORDER — LEVOTHYROXINE SODIUM 0.03 MG/1
25 TABLET ORAL
Status: DISCONTINUED | OUTPATIENT
Start: 2024-02-05 | End: 2024-02-06 | Stop reason: HOSPADM

## 2024-02-05 RX ORDER — CEFAZOLIN SODIUM 1 G/50ML
1000 SOLUTION INTRAVENOUS EVERY 8 HOURS
Status: COMPLETED | OUTPATIENT
Start: 2024-02-05 | End: 2024-02-06

## 2024-02-05 RX ORDER — ONDANSETRON 2 MG/ML
INJECTION INTRAMUSCULAR; INTRAVENOUS AS NEEDED
Status: DISCONTINUED | OUTPATIENT
Start: 2024-02-05 | End: 2024-02-05

## 2024-02-05 RX ORDER — FENTANYL CITRATE/PF 50 MCG/ML
25 SYRINGE (ML) INJECTION
Status: DISCONTINUED | OUTPATIENT
Start: 2024-02-05 | End: 2024-02-05 | Stop reason: HOSPADM

## 2024-02-05 RX ORDER — ALBUTEROL SULFATE 2.5 MG/3ML
2.5 SOLUTION RESPIRATORY (INHALATION) ONCE AS NEEDED
Status: DISCONTINUED | OUTPATIENT
Start: 2024-02-05 | End: 2024-02-05 | Stop reason: SDUPTHER

## 2024-02-05 RX ORDER — PROPOFOL 10 MG/ML
INJECTION, EMULSION INTRAVENOUS CONTINUOUS PRN
Status: DISCONTINUED | OUTPATIENT
Start: 2024-02-05 | End: 2024-02-05

## 2024-02-05 RX ORDER — DEXAMETHASONE SODIUM PHOSPHATE 4 MG/ML
INJECTION, SOLUTION INTRA-ARTICULAR; INTRALESIONAL; INTRAMUSCULAR; INTRAVENOUS; SOFT TISSUE AS NEEDED
Status: DISCONTINUED | OUTPATIENT
Start: 2024-02-05 | End: 2024-02-05

## 2024-02-05 RX ORDER — LABETALOL HYDROCHLORIDE 5 MG/ML
10 INJECTION, SOLUTION INTRAVENOUS
Status: DISCONTINUED | OUTPATIENT
Start: 2024-02-05 | End: 2024-02-05 | Stop reason: HOSPADM

## 2024-02-05 RX ORDER — ENOXAPARIN SODIUM 100 MG/ML
40 INJECTION SUBCUTANEOUS DAILY
Status: DISCONTINUED | OUTPATIENT
Start: 2024-02-06 | End: 2024-02-06 | Stop reason: HOSPADM

## 2024-02-05 RX ORDER — MIDAZOLAM HYDROCHLORIDE 2 MG/2ML
INJECTION, SOLUTION INTRAMUSCULAR; INTRAVENOUS
Status: COMPLETED | OUTPATIENT
Start: 2024-02-05 | End: 2024-02-05

## 2024-02-05 RX ORDER — CEFAZOLIN SODIUM 2 G/50ML
2000 SOLUTION INTRAVENOUS ONCE
Status: COMPLETED | OUTPATIENT
Start: 2024-02-05 | End: 2024-02-05

## 2024-02-05 RX ORDER — BUPIVACAINE HYDROCHLORIDE 2.5 MG/ML
INJECTION, SOLUTION EPIDURAL; INFILTRATION; INTRACAUDAL
Status: COMPLETED | OUTPATIENT
Start: 2024-02-05 | End: 2024-02-05

## 2024-02-05 RX ORDER — ONDANSETRON 2 MG/ML
4 INJECTION INTRAMUSCULAR; INTRAVENOUS EVERY 6 HOURS PRN
Status: DISCONTINUED | OUTPATIENT
Start: 2024-02-05 | End: 2024-02-06 | Stop reason: HOSPADM

## 2024-02-05 RX ORDER — CALCIUM CARBONATE 500 MG/1
1000 TABLET, CHEWABLE ORAL DAILY PRN
Status: DISCONTINUED | OUTPATIENT
Start: 2024-02-05 | End: 2024-02-06 | Stop reason: HOSPADM

## 2024-02-05 RX ORDER — PROPOFOL 10 MG/ML
INJECTION, EMULSION INTRAVENOUS AS NEEDED
Status: DISCONTINUED | OUTPATIENT
Start: 2024-02-05 | End: 2024-02-05

## 2024-02-05 RX ORDER — METOCLOPRAMIDE HYDROCHLORIDE 5 MG/ML
10 INJECTION INTRAMUSCULAR; INTRAVENOUS ONCE AS NEEDED
Status: DISCONTINUED | OUTPATIENT
Start: 2024-02-05 | End: 2024-02-05 | Stop reason: HOSPADM

## 2024-02-05 RX ORDER — SENNOSIDES 8.6 MG
650 CAPSULE ORAL EVERY 8 HOURS PRN
Qty: 30 TABLET | Refills: 0 | Status: SHIPPED | OUTPATIENT
Start: 2024-02-05

## 2024-02-05 RX ORDER — MAGNESIUM HYDROXIDE 1200 MG/15ML
LIQUID ORAL AS NEEDED
Status: DISCONTINUED | OUTPATIENT
Start: 2024-02-05 | End: 2024-02-05 | Stop reason: HOSPADM

## 2024-02-05 RX ORDER — OXYCODONE HYDROCHLORIDE 10 MG/1
10 TABLET ORAL EVERY 4 HOURS PRN
Status: DISCONTINUED | OUTPATIENT
Start: 2024-02-05 | End: 2024-02-06 | Stop reason: HOSPADM

## 2024-02-05 RX ORDER — HYDROMORPHONE HCL/PF 1 MG/ML
0.5 SYRINGE (ML) INJECTION
Status: DISCONTINUED | OUTPATIENT
Start: 2024-02-05 | End: 2024-02-05 | Stop reason: HOSPADM

## 2024-02-05 RX ORDER — LANOLIN ALCOHOL/MO/W.PET/CERES
3 CREAM (GRAM) TOPICAL
Status: DISCONTINUED | OUTPATIENT
Start: 2024-02-05 | End: 2024-02-06 | Stop reason: HOSPADM

## 2024-02-05 RX ORDER — CHLORHEXIDINE GLUCONATE ORAL RINSE 1.2 MG/ML
15 SOLUTION DENTAL ONCE
Status: COMPLETED | OUTPATIENT
Start: 2024-02-05 | End: 2024-02-05

## 2024-02-05 RX ORDER — ROPIVACAINE HYDROCHLORIDE 2 MG/ML
INJECTION, SOLUTION EPIDURAL; INFILTRATION; PERINEURAL
Status: COMPLETED | OUTPATIENT
Start: 2024-02-05 | End: 2024-02-05

## 2024-02-05 RX ORDER — BUPIVACAINE HYDROCHLORIDE 7.5 MG/ML
INJECTION, SOLUTION INTRASPINAL AS NEEDED
Status: DISCONTINUED | OUTPATIENT
Start: 2024-02-05 | End: 2024-02-05

## 2024-02-05 RX ORDER — DOCUSATE SODIUM 100 MG/1
100 CAPSULE, LIQUID FILLED ORAL 2 TIMES DAILY
Status: DISCONTINUED | OUTPATIENT
Start: 2024-02-05 | End: 2024-02-06 | Stop reason: HOSPADM

## 2024-02-05 RX ORDER — OXYCODONE HYDROCHLORIDE 5 MG/1
5 TABLET ORAL EVERY 6 HOURS PRN
Qty: 20 TABLET | Refills: 0 | Status: SHIPPED | OUTPATIENT
Start: 2024-02-05 | End: 2024-02-15

## 2024-02-05 RX ORDER — HYDROMORPHONE HCL IN WATER/PF 6 MG/30 ML
0.2 PATIENT CONTROLLED ANALGESIA SYRINGE INTRAVENOUS
Status: DISCONTINUED | OUTPATIENT
Start: 2024-02-05 | End: 2024-02-05 | Stop reason: HOSPADM

## 2024-02-05 RX ORDER — HYDROMORPHONE HCL/PF 1 MG/ML
0.5 SYRINGE (ML) INJECTION EVERY 2 HOUR PRN
Status: DISCONTINUED | OUTPATIENT
Start: 2024-02-05 | End: 2024-02-06 | Stop reason: HOSPADM

## 2024-02-05 RX ORDER — OXYCODONE HYDROCHLORIDE 5 MG/1
5 TABLET ORAL EVERY 4 HOURS PRN
Status: DISCONTINUED | OUTPATIENT
Start: 2024-02-05 | End: 2024-02-06 | Stop reason: HOSPADM

## 2024-02-05 RX ADMIN — METHOCARBAMOL TABLETS 500 MG: 500 TABLET, COATED ORAL at 17:49

## 2024-02-05 RX ADMIN — SODIUM CHLORIDE, SODIUM LACTATE, POTASSIUM CHLORIDE, AND CALCIUM CHLORIDE 125 ML/HR: .6; .31; .03; .02 INJECTION, SOLUTION INTRAVENOUS at 12:21

## 2024-02-05 RX ADMIN — MIDAZOLAM 2 MG: 1 INJECTION INTRAMUSCULAR; INTRAVENOUS at 12:53

## 2024-02-05 RX ADMIN — BUPIVACAINE HYDROCHLORIDE IN DEXTROSE 1.2 ML: 7.5 INJECTION, SOLUTION SUBARACHNOID at 13:53

## 2024-02-05 RX ADMIN — OXYCODONE HYDROCHLORIDE 5 MG: 5 TABLET ORAL at 23:56

## 2024-02-05 RX ADMIN — BUPIVACAINE 20 ML: 13.3 INJECTION, SUSPENSION, LIPOSOMAL INFILTRATION at 12:55

## 2024-02-05 RX ADMIN — ATORVASTATIN CALCIUM 20 MG: 20 TABLET, FILM COATED ORAL at 17:49

## 2024-02-05 RX ADMIN — CEFAZOLIN SODIUM 1000 MG: 1 SOLUTION INTRAVENOUS at 21:40

## 2024-02-05 RX ADMIN — PROPOFOL 80 MCG/KG/MIN: 10 INJECTION, EMULSION INTRAVENOUS at 13:55

## 2024-02-05 RX ADMIN — BUPIVACAINE HYDROCHLORIDE 5 ML: 2.5 INJECTION, SOLUTION EPIDURAL; INFILTRATION; INTRACAUDAL; PERINEURAL at 12:55

## 2024-02-05 RX ADMIN — ACETAMINOPHEN 325MG 650 MG: 325 TABLET ORAL at 17:52

## 2024-02-05 RX ADMIN — ONDANSETRON 4 MG: 2 INJECTION INTRAMUSCULAR; INTRAVENOUS at 14:15

## 2024-02-05 RX ADMIN — GABAPENTIN 100 MG: 100 CAPSULE ORAL at 21:39

## 2024-02-05 RX ADMIN — DEXAMETHASONE SODIUM PHOSPHATE 4 MG: 4 INJECTION INTRA-ARTICULAR; INTRALESIONAL; INTRAMUSCULAR; INTRAVENOUS; SOFT TISSUE at 14:14

## 2024-02-05 RX ADMIN — PROPOFOL 50 MG: 10 INJECTION, EMULSION INTRAVENOUS at 13:55

## 2024-02-05 RX ADMIN — DOCUSATE SODIUM 100 MG: 100 CAPSULE, LIQUID FILLED ORAL at 17:49

## 2024-02-05 RX ADMIN — SODIUM CHLORIDE, SODIUM LACTATE, POTASSIUM CHLORIDE, AND CALCIUM CHLORIDE 125 ML/HR: .6; .31; .03; .02 INJECTION, SOLUTION INTRAVENOUS at 23:57

## 2024-02-05 RX ADMIN — CEFAZOLIN SODIUM 2000 MG: 2 SOLUTION INTRAVENOUS at 13:55

## 2024-02-05 RX ADMIN — CHLORHEXIDINE GLUCONATE 0.12% ORAL RINSE 15 ML: 1.2 LIQUID ORAL at 12:20

## 2024-02-05 RX ADMIN — TRANEXAMIC ACID 1000 MG: 10 INJECTION, SOLUTION INTRAVENOUS at 14:00

## 2024-02-05 RX ADMIN — Medication 3 MG: at 21:39

## 2024-02-05 RX ADMIN — OXYCODONE HYDROCHLORIDE 5 MG: 5 TABLET ORAL at 19:38

## 2024-02-05 RX ADMIN — METHOCARBAMOL TABLETS 500 MG: 500 TABLET, COATED ORAL at 23:56

## 2024-02-05 RX ADMIN — PHENYLEPHRINE HYDROCHLORIDE 20 MCG/MIN: 10 INJECTION INTRAVENOUS at 14:02

## 2024-02-05 RX ADMIN — ROPIVACAINE HYDROCHLORIDE 20 ML: 2 INJECTION, SOLUTION EPIDURAL; INFILTRATION at 12:57

## 2024-02-05 NOTE — PLAN OF CARE
Problem: OCCUPATIONAL THERAPY ADULT  Goal: Performs self-care activities at highest level of function for planned discharge setting.  See evaluation for individualized goals.  Description: Treatment Interventions: ADL retraining, Functional transfer training, UE strengthening/ROM, Endurance training, Compensatory technique education, Continued evaluation, Energy conservation, Activityengagement, Equipment evaluation/education          See flowsheet documentation for full assessment, interventions and recommendations.   Note: Limitation: Decreased ADL status, Decreased endurance, Decreased high-level ADLs, Decreased self-care trans  Prognosis: Good  Assessment: Pt is a 73 y.o. female seen for OT evaluation s/p adm to  [facility]  on 2/5/2024 w/ Primary osteoarthritis of left knee . Comorbidities affecting pt’s functional performance include a significant PMH of hypothyroidism, hypercholesterolemia, HTN. Pt with active OT orders and activity orders for Activity beginning POD #0. Hemovac. WBAT LLE. Pt lives in a two level house with bed/bath on main level. Pt lives with spouse. Has support from sister and son. Pt has tub/shower with built in seat and standard toilets. (+) . Pt completed bed mobility with supervision and verbal cues for hand placement. Pt completed sit to stand with supervision and inc verbal cues for safe technique. Pt completed functional mobility with RW and supervision from bed to standard toilet. Toilet transfer with supervision and use of RW and grab bars for stability. Pt completed transfer to chair with supervision from standard toilet. At baseline, pt was independent with all ADLs/IADLs. Upon evaluation, pt currently requires supervision for UB ADLs, Rayne for LB ADLs, supervision for toileting, supervision for bed mobility, supervision for functional mobility, and supervision for transfers 2* the following deficits impacting occupational performance: weakness, decreased strength ,  decreased balance, decreased activity tolerance, increased pain, orthopedic restrictions, and hypotension. These impairments, as well at pt’s personal factors of: MITUL home environment, steps within home environment, limited home support, difficulty performing ADLs, difficulty performing IADLs, difficulty performing transfers/mobility, WBS, and fall risk  limit pt’s ability to safely engage in all baseline areas of occupation. Based on the aforementioned OT evaluation, functional performance deficits, and assessments, pt has been identified as a high complexity evaluation. Pt to continue to benefit from continued acute OT services during hospital stay to address defined deficits and to maximize level of functional independence in the following Occupational Performance areas: grooming, bathing/shower, toilet hygiene, dressing, health maintenance, functional mobility, community mobility, and household maintenance. From OT standpoint, recommend minimum resource intensity (pending progress) upon D/C. OT will continue to follow pt 3-5x/wk BID prn.     Rehab Resource Intensity Level, OT: III (Minimum Resource Intensity) (pending progress)

## 2024-02-05 NOTE — OCCUPATIONAL THERAPY NOTE
Occupational Therapy Evaluation     Patient Name: Carina Bang  Today's Date: 2024  Problem List  Principal Problem:    Primary osteoarthritis of left knee    Past Medical History  Past Medical History:   Diagnosis Date    Asymptomatic menopausal state     Breast lump     Hyperlipemia     Pap smear abnormality of cervix with ASCUS favoring benign     Plantar fasciitis      Past Surgical History  Past Surgical History:   Procedure Laterality Date    CATARACT EXTRACTION Bilateral      SECTION      COLONOSCOPY           24 1733   OT Last Visit   OT Visit Date 24   Note Type   Note type Evaluation   Pain Assessment   Pain Assessment Tool 0-10   Pain Score No Pain   Pain Location/Orientation Orientation: Left;Location: Knee   Restrictions/Precautions   Weight Bearing Precautions Per Order Yes   LLE Weight Bearing Per Order WBAT   Braces or Orthoses Other (Comment)  (hemovac)   Other Precautions Chair Alarm;Bed Alarm;WBS;Multiple lines;Fall Risk;Pain   Home Living   Type of Home House   Home Layout Two level;Performs ADLs on one level;Able to live on main level with bedroom/bathroom;Stairs to enter without rails  (1 MITUL no railings)   Bathroom Shower/Tub Tub/shower unit   Bathroom Toilet Standard   Bathroom Equipment Built-in shower seat;Commode   Home Equipment Walker;Cane   Prior Function   Level of [place] Independent with ADLs;Independent with functional mobility;Independent with IADLS   Lives With Spouse   Receives Help From Family   IADLs Independent with driving;Independent with meal prep;Independent with medication management   Falls in the last 6 months 0   Vocational Retired  ( for [employer])   Comments (+)    Lifestyle   Autonomy I with ADLs/IADLs, no AD use at baseline   Reciprocal Relationships Sister, son, spouse (has dementia) sister will be there for support   Service to Others retired   Intrinsic Gratification casino   ADL   Where Assessed Edge of bed   Eating  Assistance 6  Modified independent   Grooming Assistance 5  Supervision/Setup   UB Bathing Assistance 5  Supervision/Setup   LB Bathing Assistance 4  Minimal Assistance   UB Dressing Assistance 5  Supervision/Setup   LB Dressing Assistance 4  Minimal Assistance   Toileting Assistance  5  Supervision/Setup   Bed Mobility   Supine to Sit 5  Supervision   Additional items Bedrails;Increased time required;Verbal cues;LE management   Sit to Supine Unable to assess   Additional Comments verbal cues for hand placement and safe technique   Transfers   Sit to Stand 5  Supervision   Additional items Increased time required;Verbal cues  (RW)   Stand to Sit 5  Supervision   Additional items Armrests;Increased time required;Verbal cues  (RW)   Toilet transfer 5  Supervision   Additional items Increased time required;Verbal cues;Standard toilet  (RW and grab bars)   Additional Comments VITALS: supine: 138/57, /67, and back to chair 128/75   Functional Mobility   Functional Mobility 5  Supervision   Additional Comments short in room distances with RW from bed to bathroom   Additional items Rolling walker   Balance   Static Sitting Good   Dynamic Sitting Fair +   Static Standing Fair +   Dynamic Standing Fair -   Ambulatory Fair -   Activity Tolerance   Activity Tolerance Patient tolerated treatment well   Medical Staff Made Aware PT Kvng Covarrubias seen for co-evaluation with skilled Physical Therapy due to clinically unstable presentation , medical complexity, fall risk, functional balance, limited activity tolerance whish is a decline from PLOF and may impact overall safety.   Nurse Made Aware ANNIE MICHELLE Assessment   RUE Assessment WFL   LUE Assessment   LUE Assessment WFL   Hand Function   Gross Motor Coordination Functional   Fine Motor Coordination Functional   Cognition   Overall Cognitive Status WFL   Arousal/Participation Cooperative;Alert   Attention Within functional limits   Orientation Level Oriented X4   Memory  Within functional limits   Following Commands Follows one step commands without difficulty   Comments pleasant and motivated   Assessment   Limitation Decreased ADL status;Decreased endurance;Decreased high-level ADLs;Decreased self-care trans   Prognosis Good   Assessment Pt is a 73 y.o. female seen for OT evaluation s/p adm to  [facility]  on 2/5/2024 w/ Primary osteoarthritis of left knee . Comorbidities affecting pt’s functional performance include a significant PMH of hypothyroidism, hypercholesterolemia, HTN. Pt with active OT orders and activity orders for Activity beginning POD #0. Hemovac. WBAT LLE. Pt lives in a two level house with bed/bath on main level. Pt lives with spouse. Has support from sister and son. Pt has tub/shower with built in seat and standard toilets. (+) . Pt completed bed mobility with supervision and verbal cues for hand placement. Pt completed sit to stand with supervision and inc verbal cues for safe technique. Pt completed functional mobility with RW and supervision from bed to standard toilet. Toilet transfer with supervision and use of RW and grab bars for stability. Pt completed transfer to chair with supervision from standard toilet. At baseline, pt was independent with all ADLs/IADLs. Upon evaluation, pt currently requires supervision for UB ADLs, Rayne for LB ADLs, supervision for toileting, supervision for bed mobility, supervision for functional mobility, and supervision for transfers 2* the following deficits impacting occupational performance: weakness, decreased strength , decreased balance, decreased activity tolerance, increased pain, orthopedic restrictions, and hypotension. These impairments, as well at pt’s personal factors of: MITUL home environment, steps within home environment, limited home support, difficulty performing ADLs, difficulty performing IADLs, difficulty performing transfers/mobility, WBS, and fall risk  limit pt’s ability to safely engage in  all baseline areas of occupation. Based on the aforementioned OT evaluation, functional performance deficits, and assessments, pt has been identified as a high complexity evaluation. Pt to continue to benefit from continued acute OT services during hospital stay to address defined deficits and to maximize level of functional independence in the following Occupational Performance areas: grooming, bathing/shower, toilet hygiene, dressing, health maintenance, functional mobility, community mobility, and household maintenance. From OT standpoint, recommend minimum resource intensity (pending progress) upon D/C. OT will continue to follow pt 3-5x/wk BID prn.   Goals   STG Time Frame 3-5   Short Term Goal #1 Pt will improve activity tolerance to G for min 30 min treatment sessions for increase engagement in functional tasks   Short Term Goal #2 Pt will complete bed mobility at a mod I level w/ G balance/safety demonstrated to decrease caregiver assistance required   Short Term Goal  Pt will complete LB dressing/self care w/ mod I using adaptive device and DME as needed   LTG Time Frame 7-10   Long Term Goal #1 Pt will complete toileting w/ mod I w/ G hygiene/thoroughness using DME as needed   Long Term Goal #2 Pt will improve functional transfers to mod I on/off all surfaces using DME as needed w/ G balance/safety   Long Term Goal Pt will improve functional mobility during ADL/IADL/leisure tasks to mod I using DME as needed w/ G balance/safety   Plan   Treatment Interventions ADL retraining;Functional transfer training;UE strengthening/ROM;Endurance training;Compensatory technique education;Continued evaluation;Energy conservation;Activityengagement;Equipment evaluation/education   Goal Expiration Date 02/12/24   OT Treatment Day 0   OT Frequency 3-5x/wk  (BID prn)   Discharge Recommendation   Rehab Resource Intensity Level, OT III (Minimum Resource Intensity)  (pending progress)   Additional Comments  The patient's raw  score on the [facility] Daily Activity Inpatient Short Form is 20  . A raw score of greater than or equal to 19 suggests the patient may benefit from discharge to home. Please refer to the recommendation of the Occupational Therapist for safe discharge planning.   AM-[facility] Daily Activity Inpatient   Lower Body Dressing 2   Bathing 3   Toileting 3   Upper Body Dressing 4   Grooming 4   Eating 4   Daily Activity Raw Score 20   Daily Activity Standardized Score (Calc for Raw Score >=11) 42.03   [facility] Applied Cognition Inpatient   Following a Speech/Presentation 4   Understanding Ordinary Conversation 4   Taking Medications 4   Remembering Where Things Are Placed or Put Away 4   Remembering List of 4-5 Errands 4   Taking Care of Complicated Tasks 4   Applied Cognition Raw Score 24   Applied Cognition Standardized Score 62.21   End of Consult   Education Provided Yes   Patient Position at End of Consult Bedside chair;Bed/Chair alarm activated;All needs within reach   Nurse Communication Nurse aware of consult   End of Consult Comments Pt seated OOB in chair with chair alarm activated at end of session. Call bell and phone within reach. All needs met and pt reports no further questions for OT at this time.   Ester Brown, OT

## 2024-02-05 NOTE — H&P
H&P Exam - Orthopedics   Carina Bang 73 y.o. female MRN: [id number]      Assessment/Plan   Assessment:  left Knee Osteoarthritis    Plan:  left  Total Knee Arthroplasty    TOTAL KNEE REPLACEMENT INDICATIONS AND RISKS    We had a lengthy discussion with the patient regarding the potential options for treatment. The patient has had an extensive conservative management course up until this time and therefore I do not feel that any additional conservative management will provide additional relief. The patient's symptoms have progressively worsened to the point where they now limit the patient's daily activities and quality of life.     At this time, I feel that this patient would be an excellent candidate for a total knee replacement. The patient has failed non-operative care and continues to have unacceptable symptoms. We discussed the treatment options and alternatives and the risks and benefits of surgery in great detail.    While no guarantees can be made, total knee replacement has a very high success rate in terms of relieving a patient's knee pain and returning them to a more active, independent lifestyle for 10-15 years or more. All surgery carries some risk; for knee replacement, the complication rate is low but may include: death (very rare), infection, bleeding requiring transfusion, blood clots in legs traveling to lungs, nerve and/or blood vessel damage, bone fracture, persistent knee pain and/or stiffness, and repeat surgery(ies). The risk of a major complication is about 1-2 per 1000 cases. Total knee replacement should only be done if conservative treatment has failed. The revision rate for total knee replacements is about 1-2% per year; in other words, 85-95% of knee replacements may last 10 years; 75-85% last 20 years; and so on, assuming no injury to the knee. Finally we discussed anesthesia related complications which will be discussed in greater detail with the anesthesia team before surgery.      The patient was shown total knee booklets, diagrams and/or models and all of their questions have been answered at the present time. The patient may call or come in if they have any other concerns or questions. The patient also understands the post-operative rehabilitation process and the need for their cooperation and participation, and that their results may be compromised by their lack of compliance. The patient would like to proceed. Patient is encouraged to seek additional opinions if they so desire. The patient voiced their understanding of the surgical plan and potential complications and wishes to proceed with surgery.      History of Present Illness   HPI:  Carina Bang is a 73 y.o. female who presents with pain in the left knee. Patient is no longer getting adequate relief from non-operative modalities. Patient is continuing to have debilitating pain from their knee, interfering with daily activities and sleep. Patient denies any concerns with infections, new neuropathies, or any acute injuries.     Historical Information  Review Of Systems:   Skin: Normal  Neuro: See HPI  Musculoskeletal: See HPI  14 point review of systems negative except as stated above     Past Medical History:   Past Medical History:   Diagnosis Date    Asymptomatic menopausal state     Breast lump     Hyperlipemia     Pap smear abnormality of cervix with ASCUS favoring benign     Plantar fasciitis        Past Surgical History:   Past Surgical History:   Procedure Laterality Date    CATARACT EXTRACTION Bilateral      SECTION      COLONOSCOPY         Family History:  Family history reviewed and non-contributory  Family History   Problem Relation Age of Onset    Breast cancer Sister         Diagnosed in her 40s    No Known Problems Mother     No Known Problems Father     No Known Problems Maternal Grandmother     No Known Problems Maternal Grandfather     No Known Problems Paternal Grandmother     No Known Problems  Paternal Grandfather     No Known Problems Son     No Known Problems Son        Social History:  Social History     Socioeconomic History    Marital status: /Civil Union     Spouse name: None    Number of children: None    Years of education: None    Highest education level: None   Occupational History    Occupation: Retired   Tobacco Use    Smoking status: Never    Smokeless tobacco: Never   Vaping Use    Vaping status: Never Used   Substance and Sexual Activity    Alcohol use: Yes     Comment: remote social alcohol use    Drug use: No    Sexual activity: Yes     Partners: Male     Birth control/protection: Post-menopausal   Other Topics Concern    None   Social History Narrative     Normal Vaginal Delivery        Daily caffeine: 1 serving/day    Exercise habits         Social Determinants of Health     Financial Resource Strain: Not on file   Food Insecurity: Not on file   Transportation Needs: Not on file   Physical Activity: Not on file   Stress: Not on file   Social Connections: Not on file   Intimate Partner Violence: Not on file   Housing Stability: Not on file       Allergies:   Allergies   Allergen Reactions    Penicillins Other (See Comments)     Childhood   slept a long time  3 yrs old??           Labs:  0   Lab Value Date/Time    HCT 46.1 2024 1031    HCT 46.1 2023 1301    HGB 14.3 2024 1031    HGB 14.8 2023 1301    INR 0.96 2024 1031    WBC 6.59 2024 1031    WBC 8.74 2023 1301       Meds:    Current Facility-Administered Medications:     acetaminophen (TYLENOL) tablet 650 mg, 650 mg, Oral, Q6H PRN, Elo Johnson PA-C    atorvastatin (LIPITOR) tablet 20 mg, 20 mg, Oral, Daily With Dinner, Elo Johnson PA-C    bupivacaine liposomal (EXPAREL) 1.3 % injection 20 mL, 20 mL, Infiltration, Once, Vanai Rubin MD    calcium carbonate (TUMS) chewable tablet 1,000 mg, 1,000 mg, Oral, Daily PRN, Elo Johnson PA-C    ceFAZolin (ANCEF) IVPB (premix in dextrose)  1,000 mg 50 mL, 1,000 mg, Intravenous, Q8H, Elo Johnson PA-C    ceFAZolin (ANCEF) IVPB (premix in dextrose) 2,000 mg 50 mL, 2,000 mg, Intravenous, Once, Lillian Starr MD    chlorhexidine (PERIDEX) 0.12 % oral rinse 15 mL, 15 mL, Swish & Spit, Once, Lillian Starr MD    docusate sodium (COLACE) capsule 100 mg, 100 mg, Oral, BID, Elo Johnson PA-C    [START ON 2/6/2024] enoxaparin (LOVENOX) subcutaneous injection 40 mg, 40 mg, Subcutaneous, Daily, Elo Johnson PA-C    gabapentin (NEURONTIN) capsule 100 mg, 100 mg, Oral, Q8H, Elo Johnson PA-C    HYDROmorphone (DILAUDID) injection 0.5 mg, 0.5 mg, Intravenous, Q2H PRN, Elo Johnson PA-C    lactated ringers bolus 1,000 mL, 1,000 mL, Intravenous, Once PRN **AND** lactated ringers bolus 1,000 mL, 1,000 mL, Intravenous, Once PRN, Elo Johnson PA-C    lactated ringers infusion, 125 mL/hr, Intravenous, Continuous, Elo Johnson PA-C    lactated ringers infusion, 125 mL/hr, Intravenous, Continuous, Vania Rubin MD    lactated ringers infusion, 125 mL/hr, Intravenous, Continuous, Lillian Starr MD    levothyroxine tablet 25 mcg, 25 mcg, Oral, Early Morning, Elo Johnson PA-C    mepivacaine hCl (PF) (POLOCAINE) 1.5 % injection 4 mL, 4 mL, Injection, Once, Vania Rubin MD    methocarbamol (ROBAXIN) tablet 500 mg, 500 mg, Oral, Q6H DIGNA, Elo Johnson PA-C    metoprolol succinate (TOPROL-XL) 24 hr tablet 50 mg, 50 mg, Oral, Daily, Elo Johnson PA-C    ondansetron (ZOFRAN) injection 4 mg, 4 mg, Intravenous, Q6H PRN, Elo Johnson PA-C    oxyCODONE (ROXICODONE) immediate release tablet 10 mg, 10 mg, Oral, Q4H PRN, Elo Johnson PA-C    oxyCODONE (ROXICODONE) IR tablet 5 mg, 5 mg, Oral, Q4H PRN, Elo Johnson PA-C    sodium chloride 0.9 % bolus 1,000 mL, 1,000 mL, Intravenous, Once PRN **AND** sodium chloride 0.9 % bolus 1,000 mL, 1,000 mL, Intravenous, Once PRN, Elo Johnson PA-C    tranexamic acid (CYKLOKAPRON) 1000-0.7 MG/100ML-% injection 1,000 mg, 1,000  "mg, Intravenous, Once, Lillian Starr MD    Blood Culture:   No results found for: \"BLOODCX\"    Wound Culture:   No results found for: \"WOUNDCULT\"    Ins and Outs:  No intake/output data recorded.          Physical Exam  /66   Pulse 72   Temp 98.2 °F (36.8 °C) (Temporal)   Resp 17   Ht 4' 11\" (1.499 m)   Wt 71.7 kg (158 lb)   SpO2 97%   BMI 31.91 kg/m²   /66   Pulse 72   Temp 98.2 °F (36.8 °C) (Temporal)   Resp 17   Ht 4' 11\" (1.499 m)   Wt 71.7 kg (158 lb)   SpO2 97%   BMI 31.91 kg/m²   Gen: No acute distress, resting comfortably in bed  HEENT: Eyes clear, moist mucus membranes, hearing intact  Respiratory: No audible wheezing or stridor  Cardiovascular: Well Perfused peripherally, 2+ distal pulse  Abdomen: nondistended, no peritoneal signs  Ortho Exam: limited ROM due to pain and mechanical blocking  Neuro Exam: intact    Lab Results: Reviewed  Imaging: Reviewed   "

## 2024-02-05 NOTE — PHYSICAL THERAPY NOTE
PT EVALUATION    Pt. Name: Carina Bang  Pt. Age: 73 y.o.  MRN: [id number]  LENGTH OF STAY: 0    Patient Active Problem List   Diagnosis    Bullous myringitis of right ear    Stress incontinence    Obesity (BMI 30.0-34.9)    Low bone density    Hypothyroidism    Hypercholesterolemia    Encounter for gynecological examination (general) (routine) without abnormal findings    Family history of breast cancer in first degree relative    Primary osteoarthritis of left knee    Essential hypertension    Encounter for geriatric assessment       Admitting Diagnoses:   Primary osteoarthritis of left knee [M17.12]    Past Medical History:   Diagnosis Date    Asymptomatic menopausal state     Breast lump     Hyperlipemia     Pap smear abnormality of cervix with ASCUS favoring benign     Plantar fasciitis        Past Surgical History:   Procedure Laterality Date    CATARACT EXTRACTION Bilateral      SECTION      COLONOSCOPY         Imaging Studies:  No orders to display        24 1754   PT Last Visit   PT Visit Date 24   Note Type   Note type Evaluation   Pain Assessment   Pain Assessment Tool 0-10   Pain Score No Pain  (at rest. 3/10 post session.)   Pain Location/Orientation Orientation: Left;Location: Knee   Hospital Pain Intervention(s) Cold applied;Repositioned;Ambulation/increased activity;Elevated;Emotional support;Rest   Restrictions/Precautions   Weight Bearing Precautions Per Order Yes   LLE Weight Bearing Per Order WBAT   Other Precautions Chair Alarm;Bed Alarm;WBS;Multiple lines;Fall Risk;Pain  (hemovac)   Home Living   Type of Home House   Home Layout Two level;Performs ADLs on one level;Able to live on main level with bedroom/bathroom;Stairs to enter without rails  (1 MITUL without hand rails)   Bathroom Shower/Tub Tub/shower unit   Bathroom Toilet Standard   Bathroom Equipment Built-in shower seat;Commode   Home Equipment Walker;Cane   Prior Function   Level of [place] Independent  with ADLs;Independent with functional mobility;Independent with IADLS  (w/o AD)   Lives With Spouse   Receives Help From Family   IADLs Independent with driving;Independent with meal prep;Independent with medication management   Falls in the last 6 months 0   Vocational Retired   Comments PTA, pt independent with ADLs, IADLs, and functional mobility without AD. (+) . Lives with her spouse however spouse has late stage alzheimers disease and is unable to assist pt. pts sister will be staying with pt at D/C.   General   Family/Caregiver Present No   Cognition   Overall Cognitive Status WFL   Arousal/Participation Alert   Orientation Level Oriented X4   Following Commands Follows all commands and directions without difficulty   Comments Cooperative and motivated   Subjective   Subjective I do not need to use the bathroom but I should try.   RUE Assessment   RUE Assessment   (refer to OT)   LUE Assessment   LUE Assessment   (refer to OT)   RLE Assessment   RLE Assessment WFL  (4+/5 grossly)   LLE Assessment   LLE Assessment X   Strength LLE   L Hip Flexion 3+/5   L Knee Flexion 3+/5   L Knee Extension 3+/5   L Ankle Dorsiflexion 4+/5   L Ankle Plantar Flexion 4+/5   Light Touch   RLE Light Touch Grossly intact   LLE Light Touch Grossly intact   Bed Mobility   Supine to Sit 5  Supervision   Additional items Assist x 1;HOB elevated;Bedrails;Increased time required;Verbal cues   Sit to Supine Unable to assess   Additional Comments Pt greeted in supine. /57. BP /67. Pt OOB in chair post session.   Transfers   Sit to Stand 5  Supervision   Additional items Assist x 1;Bedrails;Increased time required;Verbal cues  (w/ RW)   Stand to Sit 5  Supervision   Additional items Assist x 1;Armrests;Increased time required;Verbal cues  (w/ RW)   Toilet transfer 5  Supervision   Additional items Assist x 1;Increased time required;Verbal cues;Standard toilet  (grab bar use and RW)   Additional Comments cues for hand  placement   Ambulation/Elevation   Gait pattern Antalgic;Improper Weight shift;Decreased foot clearance;Decreased L stance;Short stride;Step to;Excessively slow;Knees flexed   Gait Assistance 5  Supervision   Additional items Assist x 1;Verbal cues;Tactile cues   Assistive Device Rolling walker   Distance 10'x1; 10'x1   Ambulation/Elevation Additional Comments cues for RW management and LE sequencing   Balance   Static Sitting Normal   Dynamic Sitting Good   Static Standing Fair +  (w/ RW)   Dynamic Standing Fair -  (w/ RW)   Ambulatory Fair -  (w/ RW)   Activity Tolerance   Activity Tolerance Patient tolerated treatment well   Medical Staff Made Aware OT Ester   Nurse Made Aware RN Poppy   Assessment   Prognosis Good   Problem List Decreased range of motion;Decreased strength;Decreased endurance;Impaired balance;Decreased mobility;Pain   Assessment Pt. 73 y.o.female presents for elective surgery. PMH of stress incontinence and HTN. Pt admitted for Primary osteoarthritis of left knee w/ Primary osteoarthritis of left knee (M17.12). S/p L elective TKR POD #0. Pt referred to PT for functional mobility evaluation & D/C planning w/ orders of  activity beginning POD#0 . WBAT LLE. PTA, pt reports being I w/o AD. Pain 0/10 in L knee at rest, 3/10 post session. During evaluation, deficits included dec mobility, balance, ambulation. Required S for supine to sit, sit<>stand, toilet transfers, and ambulation. Pt was able to ambulate 10' with RW and S to bathroom. Antalgic gait with step to gait pattern. Independent with pericare. Following toileting, pt was able to further ambulate 10' with RW and S to recliner. Continued step gait with no gross LOB and no knee buckling. Cues provided during ambulation for LE sequencing and RW management with good carryover. Vitals stable throughout session. Pt demonstrated dec endurance and tolerance to activity. Denies reports of dizziness or SOB t/o session. Pt was educated on fall  precautions and reinforced w/ good understanding. Pt would benefit from continued PT to address deficits as defined above and maximize level of independence with functional mobility and safety. Based on pt presentation and impaired function, pt would benefit from level III, ( minimum resource intensity) at D/C. The patient's AM-PAC Basic Mobility Inpatient Short Form Raw Score is 19. A Raw score of greater than 16 suggests the patient may benefit from discharge to home. Please also refer to the recommendation of the Physical Therapist for safe discharge planning. Nsg staff to continue to mobilized pt (OOB in chair for all meals & ambulate in room/unit) as tolerated to prevent further decline in function. Nsg notified. Co-eval performed to complete this PT evaluation for the pts best interest given pts medical complexity and functional level.   Barriers to Discharge Inaccessible home environment   Barriers to Discharge Comments MITUL   Goals   Patient Goals to go home   STG Expiration Date 02/12/24   Short Term Goal #1 1) Inc overall LE strength by 1/2 MMT grade to improve functional mobility; 2) Pt will demonstrate improved bed mobility with mod I to dec caregiver burden; 3) Pt will demonstrate improved transfers w/ mod I for inc safety; 4) Pt will be able to amb w/ S >150' w/ RW for household distances to inc safety and dec caregiver burden; 5) Pt will be able to navigate stairs with S to dec caregiver burden and inc safety with functional mobility; 6) Improve general balance by 1 grade to inc safety; 7) PT for ongoing patient and caregiver education   PT Treatment Day 1   Plan   Treatment/Interventions Functional transfer training;LE strengthening/ROM;Elevations;Therapeutic exercise;Endurance training;Patient/family training;Bed mobility;Gait training;Spoke to nursing;OT   PT Frequency Twice a day   Discharge Recommendation   Rehab Resource Intensity Level, PT III (Minimum Resource Intensity)   Equipment Recommended  Walker  (pt owns)   AM-PAC Basic Mobility Inpatient   Turning in Flat Bed Without Bedrails 4   Lying on Back to Sitting on Edge of Flat Bed Without Bedrails 3   Moving Bed to Chair 3   Standing Up From Chair Using Arms 3   Walk in Room 3   Climb 3-5 Stairs With Railing 3   Basic Mobility Inpatient Raw Score 19   Basic Mobility Standardized Score 42.48   Highest Level Of Mobility   JH-HLM Goal 6: Walk 10 steps or more   JH-HLM Achieved 6: Walk 10 steps or more   End of Consult   Patient Position at End of Consult Bedside chair;Bed/Chair alarm activated;All needs within reach   End of Consult Comments Pt in stable condition at end of session. /75. RN notified.   Hx/personal factors: co-morbidities, inaccessible home, dec caregiver support, advanced age, mutliple lines, use of AD, pain, WB restrictions, fall risk, assist w/ ADL's, and obesity, coping styles, social background, past experience, behavior pattern  Examination: dec mobility, dec balance, dec endurance, dec amb, risk for falls, pain, assessed body system, balance, endurance, amb, D/C disposition & fall risk, WB restrictions, impairements in locomotion, musculoskeletal, balance, endurance, posture, coordination, assessed cognition, impairments in systems including multiple body structures involved; musculoskeletal (ROM, strength, posture, BMI), neuromuscular (balance,locomotion, gait, transfers, motor control and learning, sensation), joint integrity, integumentary (skin integrity, presence of scars or wounds), cardiopulmonary (vitals, edema); activity limitations (difficulties executing an action); participation restrictions (problems associated w involvement in life situations)  Clinical: unpredictable (ongoing medical status, risk for falls, POD #0, and pain mgt)  Complexity: high      Marci Boyd, PT

## 2024-02-05 NOTE — PLAN OF CARE
Problem: PHYSICAL THERAPY ADULT  Goal: Performs mobility at highest level of function for planned discharge setting.  See evaluation for individualized goals.  Description: Treatment/Interventions: Functional transfer training, LE strengthening/ROM, Elevations, Therapeutic exercise, Endurance training, Patient/family training, Bed mobility, Gait training, Spoke to nursing, OT  Equipment Recommended: Walker (pt owns)       See flowsheet documentation for full assessment, interventions and recommendations.  Note: Prognosis: Good  Problem List: Decreased range of motion, Decreased strength, Decreased endurance, Impaired balance, Decreased mobility, Pain  Assessment: Pt. 73 y.o.female presents for elective surgery. PMH of stress incontinence and HTN. Pt admitted for Primary osteoarthritis of left knee w/ Primary osteoarthritis of left knee (M17.12). S/p L elective TKR POD #0. Pt referred to PT for functional mobility evaluation & D/C planning w/ orders of  activity beginning POD#0 . WBAT LLE. PTA, pt reports being I w/o AD. Pain 0/10 in L knee at rest, 3/10 post session. During evaluation, deficits included dec mobility, balance, ambulation. Required S for supine to sit, sit<>stand, toilet transfers, and ambulation. Pt was able to ambulate 10' with RW and S to bathroom. Antalgic gait with step to gait pattern. Independent with pericare. Following toileting, pt was able to further ambulate 10' with RW and S to recliner. Continued step gait with no gross LOB and no knee buckling. Cues provided during ambulation for LE sequencing and RW management with good carryover. Vitals stable throughout session. Pt demonstrated dec endurance and tolerance to activity. Denies reports of dizziness or SOB t/o session. Pt was educated on fall precautions and reinforced w/ good understanding. Pt would benefit from continued PT to address deficits as defined above and maximize level of independence with functional mobility and safety.  Based on pt presentation and impaired function, pt would benefit from level III, ( minimum resource intensity) at D/C. The patient's AM-PAC Basic Mobility Inpatient Short Form Raw Score is 19. A Raw score of greater than 16 suggests the patient may benefit from discharge to home. Please also refer to the recommendation of the Physical Therapist for safe discharge planning. Nsg staff to continue to mobilized pt (OOB in chair for all meals & ambulate in room/unit) as tolerated to prevent further decline in function. Nsg notified. Co-eval performed to complete this PT evaluation for the pts best interest given pts medical complexity and functional level.  Barriers to Discharge: Inaccessible home environment  Barriers to Discharge Comments: MITUL  Rehab Resource Intensity Level, PT: III (Minimum Resource Intensity)    See flowsheet documentation for full assessment.

## 2024-02-05 NOTE — ANESTHESIA PROCEDURE NOTES
Peripheral Block    Patient location during procedure: holding area  Start time: 2/5/2024 12:55 PM  Reason for block: at surgeon's request and post-op pain management  Staffing  Performed by: Vania Rubin MD  Authorized by: Vania Rubin MD    Preanesthetic Checklist  Completed: patient identified, IV checked, site marked, risks and benefits discussed, surgical consent, monitors and equipment checked, pre-op evaluation and timeout performed  Peripheral Block  Patient position: supine  Prep: ChloraPrep  Patient monitoring: frequent blood pressure checks, continuous pulse oximetry and heart rate  Block type: IPACK  Laterality: left  Injection technique: single-shot  Procedures: ultrasound guided, Ultrasound guidance required for the procedure to increase accuracy and safety of medication placement and decrease risk of complications.  Ultrasound permanent image savedbupivacaine (PF) (MARCAINE) 0.25 % injection 20 mL - Perineural   5 mL - 2/5/2024 12:55:00 PM  bupivacaine liposomal (EXPAREL) 1.3 % injection 20 mL - Perineural   20 mL - 2/5/2024 12:55:00 PM  ropivacaine (NAROPIN) 0.2% injection 20 mL - Perineural   20 mL - 2/5/2024 12:57:00 PM  midazolam (VERSED) injection 0.5 mg - Intravenous   2 mg - 2/5/2024 12:53:00 PM  Needle  Needle type: Stimuplex   Needle gauge: 22 G  Needle length: 4 in  Needle localization: anatomical landmarks and ultrasound guidance  Assessment  Injection assessment: incremental injection, frequent aspiration, injected with ease, negative aspiration, negative for heart rate change, no paresthesia on injection, no symptoms of intraneural/intravenous injection and needle tip visualized at all times  Paresthesia pain: none  Post-procedure:  site cleaned  patient tolerated the procedure well with no immediate complications

## 2024-02-05 NOTE — OP NOTE
OPERATIVE REPORT  PATIENT NAME: Carina Bang  : 1950  MRN: [id number]  Pt Location:  [facility] OR ROOM 04    Surgery Date: 2024    Surgeons and Role:     * Lisandro Diez MD - Primary     * Tavares Odom PA-C - Assisting     * Elo Johnson PA-C - Assisting     Preop Diagnosis:  Primary osteoarthritis of left knee [M17.12]    Post-Op Diagnosis Codes:     * Primary osteoarthritis of left knee [M17.12]    Procedure(s):  Left - ARTHROPLASTY KNEE TOTAL W ROBOT    Specimens:  * No specimens in log *    Estimated Blood Loss:   20 mL    Drains:  Closed/Suction Drain Anterior;Left Knee Accordion 10 Fr. (Active)   Number of days: 0       Urethral Catheter Non-latex;Straight-tip 16 Fr. (Active)   Number of days: 0       Anesthesia Type:   Choice     Operative Indications:  Primary osteoarthritis of left knee [M17.12]    Operative Findings:  Depuy attune   Femur-3   Poly-8   Tibia-3   Patella-38    Complications:   None    Knee Technique: Suture (direct) Repair  Knee Approach: Medial Parapatellar    Procedure and Technique:  Following removal of spinal anesthesia, a Osuna catheter was then sterilely introduced into the patient's bladder.  Antibiotics administered.  The left thigh is a fitted with a thigh-high tourniquet.  The left lower extremity underwent sterile prep and drape.  The left lower extremity was exsanguinated to gravity and the tourniquet inflated to 300 mmHg.  A midline knee incision was carried the knee in flexion.  Full-thickness flaps were raised in order to access the extensor mechanism.  A medial arthrotomy was created to open up the knee joint.  2 half pins were placed in proximal tibia, 2 half pins were placed on distal femur.  In doing so, modules were created.  The arrays were then attached the modules.  Checkpoints made the proximal tibia distal femur.  The knee was then registered.  The surgery was planned out on the computer, plan was finalized.  The robot was docked.  First  maneuver of the distal femoral cut probably anterior posterior cuts.  The chamfer cuts complete the process.  Proximal tibia cut was made next.  Care was taken preserve the taken the medial collateral, lateral collateral, and posterior structures during these maneuvers.  The box cut was then made for the posterior stabilized unit, remnant medial and lateral meniscectomies and performed.  Trials were inserted, the knee was taken through range of motion, and found to be capable of full extension, good flexion, stable throughout.  The patella was then resurfaced while utilizing manufactures equipment, is found to be a size 38 mm button.  The trial components removed and the knee was prepared for insertion of cemented components.  The cemented tibia, cemented femur, trial poly-, cemented patella placed.  Excess cement was removed, and the knee was prior to extension.  The cement was at a cure.  The trial poly was taken out, the knee was packed off.  The tourniquet was deflated, hemostasis was secured.  The insert polyethylene was then snapped into position.  The knee was taken through final range of motion, found to be A full extension, good flexion, stable throughout, next patella tracking.  Satisfied with the extent of surgery, the wounds were flushed with saline and closed.  A Betadine soak was initiated.  A drain was placed deep brought via cephalad stab incision.  The arthrotomy was closed with #1 Vicryl suture.  The subcu tissue closed with 2-0 Vicryl suture.  The skin was closed with staples.  Sterile dressings were applied.  She was then transferred to recovery in stable condition plans include physical therapy weightbearing times, she will require DVT prophylaxis with Lovenox.  Please note, as no qualified with the resident was available assist, the assistance of a physician assistant was necessary for the safe and successful execution of the patient surgery.  Start the patient position, patient prepped draped,  IntraOp assistance, wound closure, dressing application, patient transfers, all of these were performed under my direct supervision   I was present for the entire procedure.    Patient Disposition:  PACU             SIGNATURE: Lisandro Diez MD  DATE: February 5, 2024  TIME: 3:13 PM

## 2024-02-05 NOTE — ANESTHESIA POSTPROCEDURE EVALUATION
Post-Op Assessment Note    CV Status:  Stable    Pain management: adequate       Mental Status:  Sleepy   Hydration Status:  Euvolemic   PONV Controlled:  Controlled   Airway Patency:  Patent     Post Op Vitals Reviewed: Yes    No anethesia notable event occurred.    Staff: CRNA               BP   118/74   Temp   98.2   Pulse  70   Resp   18   SpO2   98

## 2024-02-05 NOTE — ANESTHESIA PREPROCEDURE EVALUATION
Procedure:  ARTHROPLASTY KNEE TOTAL W ROBOT (Left: Knee)    Relevant Problems   ANESTHESIA (within normal limits)      CARDIO   (+) Essential hypertension   (+) Hypercholesterolemia      ENDO   (+) Hypothyroidism      MUSCULOSKELETAL   (+) Primary osteoarthritis of left knee      Other   (+) Obesity (BMI 30.0-34.9)   (+) Stress incontinence        Physical Exam    Airway    Mallampati score: II  TM Distance: >3 FB  Neck ROM: full     Dental   No notable dental hx     Cardiovascular  Rhythm: regular, Rate: normal, Cardiovascular exam normal    Pulmonary  Pulmonary exam normal Breath sounds clear to auscultation    Other Findings  post-pubertal.      Anesthesia Plan  ASA Score- 2     Anesthesia Type- spinal with ASA Monitors.         Additional Monitors:     Airway Plan:            Plan Factors-Exercise tolerance (METS): >4 METS.    Chart reviewed.   Existing labs reviewed. Patient summary reviewed.    Patient is not a current smoker.              Induction-     Postoperative Plan-     Informed Consent- Anesthetic plan and risks discussed with patient.  I personally reviewed this patient with the CRNA. Discussed and agreed on the Anesthesia Plan with the CRNA..

## 2024-02-06 VITALS
HEART RATE: 63 BPM | SYSTOLIC BLOOD PRESSURE: 129 MMHG | BODY MASS INDEX: 31.85 KG/M2 | OXYGEN SATURATION: 95 % | RESPIRATION RATE: 18 BRPM | DIASTOLIC BLOOD PRESSURE: 52 MMHG | TEMPERATURE: 98 F | HEIGHT: 59 IN | WEIGHT: 158 LBS

## 2024-02-06 LAB
ANION GAP SERPL CALCULATED.3IONS-SCNC: 9 MMOL/L
BUN SERPL-MCNC: 10 MG/DL (ref 5–25)
CALCIUM SERPL-MCNC: 8.5 MG/DL (ref 8.4–10.2)
CHLORIDE SERPL-SCNC: 104 MMOL/L (ref 96–108)
CO2 SERPL-SCNC: 24 MMOL/L (ref 21–32)
CREAT SERPL-MCNC: 0.66 MG/DL (ref 0.6–1.3)
ERYTHROCYTE [DISTWIDTH] IN BLOOD BY AUTOMATED COUNT: 14 % (ref 11.6–15.1)
GFR SERPL CREATININE-BSD FRML MDRD: 87 ML/MIN/1.73SQ M
GLUCOSE SERPL-MCNC: 142 MG/DL (ref 65–140)
HCT VFR BLD AUTO: 37.3 % (ref 34.8–46.1)
HGB BLD-MCNC: 12.3 G/DL (ref 11.5–15.4)
MCH RBC QN AUTO: 29.9 PG (ref 26.8–34.3)
MCHC RBC AUTO-ENTMCNC: 33 G/DL (ref 31.4–37.4)
MCV RBC AUTO: 91 FL (ref 82–98)
PLATELET # BLD AUTO: 195 THOUSANDS/UL (ref 149–390)
PMV BLD AUTO: 10.4 FL (ref 8.9–12.7)
POTASSIUM SERPL-SCNC: 4.2 MMOL/L (ref 3.5–5.3)
RBC # BLD AUTO: 4.12 MILLION/UL (ref 3.81–5.12)
SODIUM SERPL-SCNC: 137 MMOL/L (ref 135–147)
WBC # BLD AUTO: 12.93 THOUSAND/UL (ref 4.31–10.16)

## 2024-02-06 PROCEDURE — 99024 POSTOP FOLLOW-UP VISIT: CPT | Performed by: PHYSICIAN ASSISTANT

## 2024-02-06 PROCEDURE — 97530 THERAPEUTIC ACTIVITIES: CPT | Performed by: PHYSICAL THERAPIST

## 2024-02-06 PROCEDURE — 80048 BASIC METABOLIC PNL TOTAL CA: CPT

## 2024-02-06 PROCEDURE — 85027 COMPLETE CBC AUTOMATED: CPT

## 2024-02-06 PROCEDURE — NC001 PR NO CHARGE: Performed by: PHYSICIAN ASSISTANT

## 2024-02-06 PROCEDURE — 97535 SELF CARE MNGMENT TRAINING: CPT

## 2024-02-06 PROCEDURE — 97530 THERAPEUTIC ACTIVITIES: CPT

## 2024-02-06 RX ORDER — KETOROLAC TROMETHAMINE 30 MG/ML
15 INJECTION, SOLUTION INTRAMUSCULAR; INTRAVENOUS ONCE
Status: COMPLETED | OUTPATIENT
Start: 2024-02-06 | End: 2024-02-06

## 2024-02-06 RX ADMIN — LEVOTHYROXINE SODIUM 25 MCG: 25 TABLET ORAL at 05:21

## 2024-02-06 RX ADMIN — ENOXAPARIN SODIUM 40 MG: 40 INJECTION SUBCUTANEOUS at 08:04

## 2024-02-06 RX ADMIN — GABAPENTIN 100 MG: 100 CAPSULE ORAL at 05:21

## 2024-02-06 RX ADMIN — ACETAMINOPHEN 325MG 650 MG: 325 TABLET ORAL at 08:03

## 2024-02-06 RX ADMIN — OXYCODONE HYDROCHLORIDE 5 MG: 5 TABLET ORAL at 05:22

## 2024-02-06 RX ADMIN — METOPROLOL SUCCINATE 50 MG: 25 TABLET, EXTENDED RELEASE ORAL at 08:04

## 2024-02-06 RX ADMIN — DOCUSATE SODIUM 100 MG: 100 CAPSULE, LIQUID FILLED ORAL at 08:03

## 2024-02-06 RX ADMIN — OXYCODONE HYDROCHLORIDE 5 MG: 5 TABLET ORAL at 10:53

## 2024-02-06 RX ADMIN — METHOCARBAMOL TABLETS 500 MG: 500 TABLET, COATED ORAL at 05:21

## 2024-02-06 RX ADMIN — CEFAZOLIN SODIUM 1000 MG: 1 SOLUTION INTRAVENOUS at 05:21

## 2024-02-06 RX ADMIN — METHOCARBAMOL TABLETS 500 MG: 500 TABLET, COATED ORAL at 11:58

## 2024-02-06 RX ADMIN — KETOROLAC TROMETHAMINE 15 MG: 30 INJECTION, SOLUTION INTRAMUSCULAR; INTRAVENOUS at 08:23

## 2024-02-06 NOTE — PLAN OF CARE
Problem: PAIN - ADULT  Goal: Verbalizes/displays adequate comfort level or baseline comfort level  Description: Interventions:  - Encourage patient to monitor pain and request assistance  - Assess pain using appropriate pain scale  - Administer analgesics based on type and severity of pain and evaluate response  - Implement non-pharmacological measures as appropriate and evaluate response  - Consider cultural and social influences on pain and pain management  - Notify physician/advanced practitioner if interventions unsuccessful or patient reports new pain  2/6/2024 1156 by Poppy Zambrano RN  Outcome: Adequate for Discharge  2/6/2024 0740 by Poppy Zambrano, RN  Outcome: Progressing

## 2024-02-06 NOTE — PROGRESS NOTES
Orthopaedic Surgery - Progress Note  Carina Bang (73 y.o. female)   : 1950   MRN: [id number]  Date: 2024   Encounter: [id number]   Unit/Bed#: [facility] -    Assessment / Plan  Postop day 1 status post left total knee replacement    Continue with ice and analgesics as needed for pain.  Continue to progress activity/weightbearing as tolerated.  PT/OT as ordered.  Lovenox for DVT prophylaxis for 30 days total treatment.  Hemoglobin 12.3 this morning stable with no signs of blood loss on exam.  Plan for discharge home when cleared from therapy standpoint.    Subjective  73-year-old female postop day 1 status post left total knee replacement.  Overall patient is doing well.  She does have discomfort which she describes as achiness in the operative knee.  She denies any distal numbness or tingling at this time.  She states she has been up with physical therapy and feels that she is tolerating it well.  Her plan is for discharge home when cleared from a therapy standpoint.    Vitals  Temp:  [97.4 °F (36.3 °C)-98.2 °F (36.8 °C)] 98 °F (36.7 °C)  HR:  [68-98] 68  Resp:  [16-20] 18  BP: (107-165)/(45-79) 137/61  Body mass index is 31.91 kg/m².  I/O last 24 hours:  In:  [P.O.:240; I.V.:1700; IV Piggyback:150]  Out: 995 [Urine:850; Drains:125; Blood:20]    Left Knee Exam  Alignment:  Normal knee alignment.  Inspection:   Dressing is clean, dry and intact at this time, drain was removed this morning.  Palpation:   Mild tenderness at yuri-incisional area.  ROM:  Normal ankle ROM.  Strength:  5/5 AT, GSC, PT, and peroneals.  Stability:  Not tested.  Tests:  No pertinent positive or negative tests.  Patella:  Not tested.  Neurovascular:  Sensation intact in DP/SP/Alonzo/Sa/T nerve distributions. Sensation intact in all digital nerve distributions.  Toes warm and perfused.  Gait:  Not tested.     Lab Results  (I have personally reviewed pertinent lab results.)  Results from last 7 days   Lab Units 24  0606    WBC Thousand/uL 12.93*   HEMOGLOBIN g/dL 12.3   HEMATOCRIT % 37.3   PLATELETS Thousands/uL 195         Results from last 7 days   Lab Units 02/06/24  0606   POTASSIUM mmol/L 4.2   CHLORIDE mmol/L 104   CO2 mmol/L 24   BUN mg/dL 10   CREATININE mg/dL 0.66   EGFR ml/min/1.73sq m 87   CALCIUM mg/dL 8.5               Richard Onofre PA-C

## 2024-02-06 NOTE — PLAN OF CARE
Problem: OCCUPATIONAL THERAPY ADULT  Goal: Performs self-care activities at highest level of function for planned discharge setting.  See evaluation for individualized goals.  Description: Treatment Interventions: ADL retraining, Functional transfer training, UE strengthening/ROM, Endurance training, Compensatory technique education, Continued evaluation, Energy conservation, Activityengagement, Equipment evaluation/education          See flowsheet documentation for full assessment, interventions and recommendations.   2/6/2024 1548 by Ester Brown OT  Outcome: Adequate for Discharge  Note: Limitation: Decreased ADL status, Decreased endurance, Decreased high-level ADLs, Decreased self-care trans  Prognosis: Good  Assessment: Pt seen for OT treatment session focusing on ADLs, functional mobility, functional transfers, functional standing tolerance, patient education, continued evaluation. Pt greeted in bedside chair at start of session. Pt alert and cooperative throughout session. Pt with good sitting balance and fair+ standing balance. Pt tolerated treatment well. Pt completed toilet transfer from chair to toilet with RW and grab bars for stability. Pt completed hand washing at edge of sink with RW for stability. PT requiring min verbal cues for hand placement and safe technique. Pt completed LB dressing (don underwear) and don/doff socks and shoes with supervision seated in chair.  Pt educated on dressing techniques for independence at home. Pt completed functional mobility household distance with RW for stability and supervision. Pt reports no pain and no dizziness. Pt's vitals include: stable, in chair 129/52.     Pt ended session seated in bedside chair. Call bell and phone within reach. All needs met and pt reports no further questions at this time. Continue to recommend no post acute rehabilitation needs when medically cleared. OT will continue to follow pt on caseload.     Rehab Resource Intensity Level,  OT: No post-acute rehabilitation needs       2/6/2024 1548 by Ester Brown, OT  Reactivated

## 2024-02-06 NOTE — CASE MANAGEMENT
Case Management Progress Note    Patient name Carina Bang  Location 2 N /WE 2 N * MRN [id number]  : 1950 Date 2024       LOS (days): 0  Geometric Mean LOS (GMLOS) (days):   Days to GMLOS:        OBJECTIVE:        Current admission status: Outpatient Surgery  Preferred Pharmacy:   [id number] [place] - [address] [place] [address]  Phone: [phone] Fax: [phone]    Primary Care Provider: YA Garnica    Primary Insurance: MEDICARE  [facility]    PROGRESS NOTE:    CM consulted by orthopedics s/p knee replacement. Per Ortho pre-op assessment/PT assessment, pt resides in a two story home with spouse and was independent with ADLs prior to admission. Pt has a RW. Therapy recommending OP therapy. No CM needs identified at this time. CM will continue to follow.       Message from therapy that patient needs HHC due  to spouse having Alzheimer’s and not being able to be left alone.    Call to patient and discussed HHC and criteria and Homebound status.  Patient reports she wants to do her OP therapy as planned pre-op and declines  HHC.  Update WE-ORTHO team same

## 2024-02-06 NOTE — OCCUPATIONAL THERAPY NOTE
Occupational Therapy Progress Note     Patient Name: Carina Bang  Today's Date: 2/6/2024  Problem List  Principal Problem:    Primary osteoarthritis of left knee       02/06/24 1144   OT Last Visit   OT Visit Date 02/06/24   Note Type   Note Type Treatment   Pain Assessment   Pain Assessment Tool 0-10   Pain Score No Pain   Pain Location/Orientation Orientation: Left;Location: Knee   Restrictions/Precautions   Weight Bearing Precautions Per Order Yes   LLE Weight Bearing Per Order WBAT   Other Precautions Chair Alarm;Bed Alarm;WBS;Fall Risk   Lifestyle   Autonomy I with ADLs/IADLs, no AD use at baseline   Reciprocal Relationships Sister, son, spouse (has dementia) sister will be there for support   Service to Others retired   Intrinsic Gratification casino   ADL   Where Assessed Chair   Grooming Assistance 5  Supervision/Setup   Grooming Deficit Supervision/safety;Wash/dry hands   Grooming Comments Pt completed hand washing at edge of sink with RW for stability.   LB Dressing Assistance 5  Supervision/Setup   LB Dressing Deficit Setup;Verbal cueing;Supervision/safety   LB Dressing Comments Pt completed LB dressing (don underwear) and don/doff socks and shoes with supervision seated in chair.   Toileting Assistance  5  Supervision/Setup   Toileting Deficit Supervison/safety   Functional Standing Tolerance   Time ~1-2 min   Activity washing hands standing at sink and standing to pull pants up at chair with RW for stability   Comments RW   Bed Mobility   Additional Comments Pt greeted in chair upon arrival   Transfers   Sit to Stand 5  Supervision   Additional items Armrests;Increased time required;Verbal cues  (RW)   Stand to Sit 5  Supervision   Additional items Armrests;Increased time required;Verbal cues  (RW)   Toilet transfer 5  Supervision   Additional items Armrests;Increased time required;Standard toilet;Other  (RW and grab barws)   Additional Comments cues for hand placement   Functional Mobility  Problem: Patient Care Overview  Goal: Plan of Care Review  Outcome: Ongoing (interventions implemented as appropriate)  Rated anxiety and depression as 10. Denies S.I. Withdrawn to room most of room except out for a brief phone call and to get his meds. Stated he doesn't believe he'll have ECT today and just wants to go home.   06/18/18 0312   Coping/Psychosocial   Plan of Care Reviewed With patient   Coping/Psychosocial   Patient Agreement with Plan of Care agrees   Plan of Care Review   Progress no change       Problem: Overarching Goals (Adult)  Goal: Adheres to Safety Considerations for Self and Others  Outcome: Ongoing (interventions implemented as appropriate)    Goal: Optimized Coping Skills in Response to Life Stressors  Outcome: Ongoing (interventions implemented as appropriate)    Goal: Develops/Participates in Therapeutic [place] to Support Successful Transition  Outcome: Ongoing (interventions implemented as appropriate)           Functional Mobility 5  Supervision   Additional Comments functional household distances   Additional items Rolling walker   Toilet Transfers   Toilet Transfer From Other (Comment)  (Chair)   Toilet Transfer Type To   Toilet Transfer to Standard toilet   Toilet Transfer Technique Ambulating   Toilet Transfers Supervision   Toilet Transfers Comments RW   Cognition   Overall Cognitive Status WFL   Arousal/Participation Cooperative;Alert   Attention Within functional limits   Orientation Level Oriented X4   Memory Within functional limits   Following Commands Follows all commands and directions without difficulty   Comments   (Pleasant and cooperative)   Activity Tolerance   Activity Tolerance Patient tolerated treatment well   Medical Staff Made Aware PT ANNIE Pretty   Assessment   Assessment Pt seen for OT treatment session focusing on ADLs, functional mobility, functional transfers, functional standing tolerance, patient education, continued evaluation. Pt greeted in bedside chair at start of session. Pt alert and cooperative throughout session. Pt with good sitting balance and fair+ standing balance. Pt tolerated treatment well. Pt completed toilet transfer from chair to toilet with RW and grab bars for stability. Pt completed hand washing at edge of sink with RW for stability. PT requiring min verbal cues for hand placement and safe technique. Pt completed LB dressing (don underwear) and don/doff socks and shoes with supervision seated in chair.  Pt educated on dressing techniques for independence at home. Pt completed functional mobility household distance with RW for stability and supervision. Pt reports no pain and no dizziness. Pt's vitals include: stable, in chair 129/52.     Pt ended session seated in bedside chair. Call bell and phone within reach. All needs met and pt reports no further questions at this time. Continue to recommend no post acute rehabilitation needs when medically cleared. OT will  continue to follow pt on caseload.   Plan   Treatment Interventions ADL retraining;Functional transfer training;Endurance training;Equipment evaluation/education;Compensatory technique education;Continued evaluation;Energy conservation;Activityengagement   Goal Expiration Date 02/12/24   OT Treatment Day 1   OT Frequency 3-5x/wk  (BID prn)   Discharge Recommendation   Rehab Resource Intensity Level, OT No post-acute rehabilitation needs   Additional Comments  The patient's raw score on the AM-PAC Daily Activity Inpatient Short Form is 21. A raw score of greater than or equal to 19 suggests the patient may benefit from discharge to home. Please refer to the recommendation of the Occupational Therapist for safe discharge planning.   -PAC Daily Activity Inpatient   Lower Body Dressing 3   Bathing 3   Toileting 3   Upper Body Dressing 4   Grooming 4   Eating 4   Daily Activity Raw Score 21   Daily Activity Standardized Score (Calc for Raw Score >=11) 44.27   AM-PAC Applied Cognition Inpatient   Following a Speech/Presentation 4   Understanding Ordinary Conversation 4   Taking Medications 4   Remembering Where Things Are Placed or Put Away 4   Remembering List of 4-5 Errands 4   Taking Care of Complicated Tasks 4   Applied Cognition Raw Score 24   Applied Cognition Standardized Score 62.21   End of Consult   Education Provided Yes   Patient Position at End of Consult Bedside chair;All needs within reach   Nurse Communication Nurse aware of consult   End of Consult Comments Pt seated OOB in chair at end of session.   Ester Brown, OT

## 2024-02-06 NOTE — PROGRESS NOTES
"Progress Note - General Surgery   Carina Bang 73 y.o. female MRN: [id number]  Unit/Bed#: [facility] N -01 Encounter: [id number]      Subjective:   72 yo s/p left TKA.  Patient is doing well postop. Pain is controlled. Denies HA, dizziness, CP, N/V.     Objective:     Blood pressure 165/79, pulse 83, temperature (!) 97.4 °F (36.3 °C), resp. rate 18, height 4' 11\" (1.499 m), weight 71.7 kg (158 lb), SpO2 91%.    Physical Exam:  General: Alert and oriented x 4  CV: RRR; no murmurs, gallops, or rubs  Resp: No SOB; lungs CTA - no wheezes, rales or rhonchi  Abd: BS x 4 quadrants, soft and nontender    Left lower extremity:  Dressings C/D/I  Drain 105 cc since surgery  2+ DP pulse  Toes warm and well perfused  Motor and sensation grossly intact distally    Labs:  0   Lab Value Date/Time    HCT 46.1 01/29/2024 1031    HCT 46.1 12/14/2023 1301    HGB 14.3 01/29/2024 1031    HGB 14.8 12/14/2023 1301    INR 0.96 01/29/2024 1031    WBC 6.59 01/29/2024 1031    WBC 8.74 12/14/2023 1301     Meds:    Current Facility-Administered Medications:     acetaminophen (TYLENOL) tablet 650 mg, 650 mg, Oral, Q6H PRN, Elo Johnson PA-C, 650 mg at 02/05/24 1752    atorvastatin (LIPITOR) tablet 20 mg, 20 mg, Oral, Daily With Dinner, Elo Johnson PA-C, 20 mg at 02/05/24 1749    calcium carbonate (TUMS) chewable tablet 1,000 mg, 1,000 mg, Oral, Daily PRN, Elo Johnson PA-C    ceFAZolin (ANCEF) IVPB (premix in dextrose) 1,000 mg 50 mL, 1,000 mg, Intravenous, Q8H, Elo Johnson PA-C    docusate sodium (COLACE) capsule 100 mg, 100 mg, Oral, BID, Elo Johnson PA-C, 100 mg at 02/05/24 1749    [START ON 2/6/2024] enoxaparin (LOVENOX) subcutaneous injection 40 mg, 40 mg, Subcutaneous, Daily, Elo Johnson PA-C    gabapentin (NEURONTIN) capsule 100 mg, 100 mg, Oral, Q8H, Elo Johnson PA-C    HYDROmorphone (DILAUDID) injection 0.5 mg, 0.5 mg, Intravenous, Q2H PRN, Elo Johnson PA-C    ipratropium-albuterol (DUO-NEB) 0.5-2.5 mg/3 mL inhalation solution 3 " mL, 3 mL, Nebulization, Once PRN, Vania Rubin MD    lactated ringers bolus 1,000 mL, 1,000 mL, Intravenous, Once PRN **AND** lactated ringers bolus 1,000 mL, 1,000 mL, Intravenous, Once PRN, Elo Johnson PA-C    lactated ringers infusion, 125 mL/hr, Intravenous, Continuous, Elo Johnson PA-C    lactated ringers infusion, 125 mL/hr, Intravenous, Continuous, Vania Rubin MD    lactated ringers infusion, 125 mL/hr, Intravenous, Continuous, Lillian Starr MD, Last Rate: 125 mL/hr at 02/05/24 1221, Restarted at 02/05/24 1346    levothyroxine tablet 25 mcg, 25 mcg, Oral, Early Morning, Elo Johnson PA-C    methocarbamol (ROBAXIN) tablet 500 mg, 500 mg, Oral, Q6H DIGNA, Elo Johnson PA-C, 500 mg at 02/05/24 1749    metoprolol succinate (TOPROL-XL) 24 hr tablet 50 mg, 50 mg, Oral, Daily, Elo Johnson PA-C    ondansetron (ZOFRAN) injection 4 mg, 4 mg, Intravenous, Q6H PRN, Elo Johnson PA-C    oxyCODONE (ROXICODONE) immediate release tablet 10 mg, 10 mg, Oral, Q4H PRN, Elo Johnson PA-C    oxyCODONE (ROXICODONE) IR tablet 5 mg, 5 mg, Oral, Q4H PRN, Elo Johnson PA-C    sodium chloride 0.9 % bolus 1,000 mL, 1,000 mL, Intravenous, Once PRN **AND** sodium chloride 0.9 % bolus 1,000 mL, 1,000 mL, Intravenous, Once PRN, Elo Johnson PA-C    Assessment/Plan:     Assessment:     73 y.o.female post operative day 0 left total knee arthroplasty. Doing well. Pain controlled.    Plan:    Weight Bearing as tolerated LLE  Up and out of bed with walker  DVT prophylaxis - Lovenox  Drain d/c in AM  Analgesics and ice  PT/OT  Will continue to assess for acute blood loss anemia  Discharge per ortho when cleared by PT  Point of care follow-up with PCP  Postop appointment with ortho    Johanna Nassar, PA-C

## 2024-02-06 NOTE — PLAN OF CARE
Problem: PHYSICAL THERAPY ADULT  Goal: Performs mobility at highest level of function for planned discharge setting.  See evaluation for individualized goals.  Description: Treatment/Interventions: Functional transfer training, LE strengthening/ROM, Elevations, Therapeutic exercise, Endurance training, Gait training, Spoke to nursing, Spoke to case management, OT  Equipment Recommended: Walker (pt owns)       See flowsheet documentation for full assessment, interventions and recommendations.  Outcome: Progressing  Note: Prognosis: Good  Problem List: Decreased range of motion, Decreased strength, Decreased endurance, Impaired balance, Decreased mobility, Pain  Assessment: Patient was seen today per POC. Overall, pt demonstrated improved mobility and inc tolerance to activity. Patient was able to amb 90'x2 with RW and S. Gait deviations as mentioned above, no reports of dizziness t/o session. BP taken at beginning of session, 129/52. Pt was able to perform sit<>stand from recliner and toilet with S and RW. Pt performed 2 steps with S and RW, needing some cueing for proper sequence of LE.  From PT standpoint continued recommendation for home with OPPT at D/C when medically cleared based on nursing. Nsg staff to continue to mobilized pt (OOB in chair for all meals & ambulate in room/unit) as tolerated to prevent further decline in function. Nsg staff notified.  Based on pt presentation and impaired function, pt would benefit from level III, (minimal resource intensity) at D/C.  Barriers to Discharge: None  Barriers to Discharge Comments: MITUL  Rehab Resource Intensity Level, PT: III (Minimum Resource Intensity)    See flowsheet documentation for full assessment.

## 2024-02-06 NOTE — PHYSICAL THERAPY NOTE
PT PROGRESS NOTE    Name: Carina Bang  AGE: 73 y.o.  MRN: [id number]  LENGTH OF STAY: 1           02/06/24 1143   PT Last Visit   PT Visit Date 02/06/24   Note Type   Note Type Treatment   Pain Assessment   Pain Assessment Tool 0-10   Pain Score No Pain   Pain Location/Orientation Orientation: Left;Location: Knee   Restrictions/Precautions   Weight Bearing Precautions Per Order Yes   LLE Weight Bearing Per Order WBAT   Other Precautions Chair Alarm;Bed Alarm;Fall Risk;Pain;WBS   General   Chart Reviewed Yes   Family/Caregiver Present No   Cognition   Overall Cognitive Status WFL   Arousal/Participation Alert;Cooperative   Attention Within functional limits   Orientation Level Oriented X4   Following Commands Follows all commands and directions without difficulty   Comments cooperative and pleasant   Subjective   Subjective I am feeling pretty good   Bed Mobility   Additional Comments pt greeted in recliner chair   Transfers   Sit to Stand 5  Supervision   Additional items Assist x 1;Increased time required;Verbal cues   Stand to Sit 5  Supervision   Additional items Assist x 1;Increased time required;Verbal cues   Toilet transfer 5  Supervision   Additional items Assist x 1;Increased time required;Verbal cues   Additional Comments RW, cues for hand placement   Ambulation/Elevation   Gait pattern Improper Weight shift;Decreased L stance;Excessively slow;Decreased toe off;Decreased heel strike   Gait Assistance 5  Supervision   Additional items Assist x 1;Verbal cues   Assistive Device Rolling walker   Distance 90'x2   Stair Management Assistance 5  Supervision   Additional items Assist x 1;Verbal cues;Increased time required   Stair Management Technique No rails;Step to pattern;Foreward;With walker   Number of Stairs 2   Ambulation/Elevation Additional Comments cues for proper sequence during stair training   Balance   Static Sitting Good   Dynamic Sitting Fair +   Static Standing Fair   Dynamic Standing Fair -    Ambulatory Fair -   Endurance Deficit   Endurance Deficit Yes   Endurance Deficit Description pain   Activity Tolerance   Activity Tolerance Patient tolerated treatment well   Medical Staff Made Aware OT Ester   Nurse Made Aware Rn Poppy   Assessment   Prognosis Good   Problem List Decreased range of motion;Decreased strength;Decreased endurance;Impaired balance;Decreased mobility;Pain   Assessment Patient was seen today per POC. Overall, pt demonstrated improved mobility and inc tolerance to activity. Patient was able to amb 90'x2 with RW and S. Gait deviations as mentioned above, no reports of dizziness t/o session. BP taken at beginning of session, 129/52. Pt was able to perform sit<>stand from recliner and toilet with S and RW. Pt performed 2 steps with S and RW, needing some cueing for proper sequence of LE.  From PT standpoint continued recommendation for home with OPPT at D/C when medically cleared based on nursing. Nsg staff to continue to mobilized pt (OOB in chair for all meals & ambulate in room/unit) as tolerated to prevent further decline in function. Nsg staff notified.  Based on pt presentation and impaired function, pt would benefit from level III, (minimal resource intensity) at D/C.   Barriers to Discharge None   Goals   Patient Goals to go home   STG Expiration Date 02/12/24   Short Term Goal #1 1) Inc overall LE strength by 1/2 MMT grade to improve functional mobility; 2) Pt will demonstrate improved bed mobility with mod I to dec caregiver burden; 3) Pt will demonstrate improved transfers w/ mod I for inc safety; 4) Pt will be able to amb w/ S >150' w/ RW for household distances to inc safety and dec caregiver burden; 5) Pt will be able to navigate stairs with S to dec caregiver burden and inc safety with functional mobility; 6) Improve general balance by 1 grade to inc safety; 7) PT for ongoing patient and caregiver education   PT Treatment Day 2   Plan   Treatment/Interventions Functional  transfer training;LE strengthening/ROM;Elevations;Therapeutic exercise;Endurance training;Gait training;Spoke to nursing;Spoke to case management;OT   Progress Progressing toward goals   PT Frequency Twice a day   Discharge Recommendation   Rehab Resource Intensity Level, PT III (Minimum Resource Intensity)   Equipment Recommended Walker  (pt owns)   Additional Comments The patient's AM-PAC Basic Mobility Inpatient Short Form Raw Score is 19. A Raw score of greater than 16 suggests the patient may benefit from discharge to home. Please also refer to the recommendation of the Physical Therapist for safe discharge planning.   AM-PAC Basic Mobility Inpatient   Turning in Flat Bed Without Bedrails 4   Lying on Back to Sitting on Edge of Flat Bed Without Bedrails 3   Moving Bed to Chair 3   Standing Up From Chair Using Arms 3   Walk in Room 3   Climb 3-5 Stairs With Railing 3   Basic Mobility Inpatient Raw Score 19   Basic Mobility Standardized Score 42.48   Highest Level Of Mobility   JH-HLM Goal 6: Walk 10 steps or more   JH-HLM Achieved 7: Walk 25 feet or more   Education   Education Provided Home exercise program   Patient Demonstrates acceptance/verbal understanding   End of Consult   Patient Position at End of Consult Bedside chair;Bed/Chair alarm activated;All needs within reach   End of Consult Comments Pt luis, RN aware     Ana Salas, PT

## 2024-02-07 ENCOUNTER — TELEPHONE (OUTPATIENT)
Dept: OBGYN CLINIC | Facility: HOSPITAL | Age: 74
End: 2024-02-07

## 2024-02-07 NOTE — TELEPHONE ENCOUNTER
"Patient contacted for a postoperative follow up assessment. Patient reports doing \"okay, just achey.\" SHe is ambulating with the RW.   Patient reports increase in swelling this morning, she has OP PT tomorrow (2/8) and dressing is clean, dry and intact. Patient is icing the site we discussed continuing to do so over the next few weeks.     We reviewed patients AVS medication list. Patient is taking Tylenol 650mg every 8 hours, Oxycodone 5mg PRN, Lovenox daily at night. I recommended an OTC stool softener, she has not yet had a BM. She questioned taking her BP medications, we discussed taking her BP in morning, and if running low, holding the BP meds.    Patient denies nausea, vomiting, abdominal pain, chest pain, shortness of breath, fever, dizziness and calf pain. Patient does not have any other questions or concerns at this time. Pt was encouraged to call with any questions, concerns or issues.    "

## 2024-02-08 ENCOUNTER — OFFICE VISIT (OUTPATIENT)
Dept: PHYSICAL THERAPY | Facility: MEDICAL CENTER | Age: 74
End: 2024-02-08
Payer: MEDICARE

## 2024-02-08 DIAGNOSIS — M17.12 PRIMARY OSTEOARTHRITIS OF LEFT KNEE: Primary | ICD-10-CM

## 2024-02-08 PROCEDURE — 97110 THERAPEUTIC EXERCISES: CPT | Performed by: PHYSICAL THERAPIST

## 2024-02-08 PROCEDURE — 97164 PT RE-EVAL EST PLAN CARE: CPT | Performed by: PHYSICAL THERAPIST

## 2024-02-08 NOTE — PROGRESS NOTES
PT Evaluation     Today's date: 2024  Patient name: Carina Bang  : 1950  MRN: [id number]  Referring provider: Lillian Starr,*  Dx:   Encounter Diagnosis     ICD-10-CM    1. Primary osteoarthritis of left knee  M17.12                      Assessment  Assessment details: Carina Bang is a 73 y.o. female who presents with Primary osteoarthritis of left knee  (primary encounter diagnosis).  Patient presents alert and oriented with the above impairments. . Carina will benefit from PT to addres deficits in order to maximize and return to prior level of function.  No further referral appears necessary at this time based upon examination results.  Prognosis is good given HEP compliance. Please contact me if you have any questions or recommendations.     Impairments: abnormal gait, abnormal muscle tone, abnormal or restricted ROM, abnormal movement, impaired physical strength, lacks appropriate home exercise program, pain with function and weight-bearing intolerance  Understanding of Dx/Px/POC: good   Prognosis: good    Goals  Short Term Goals:   1.  Pain decreased 2 subjective ratings in 4 weeks  2.  Increased knee flexion 10* in 4 weeks  3.  Increased knee extension 5* in 4 weeks  4.  Strength improved 1/2 grade in 4 weeks    Long Term Goals:  1.  Ambulating without AD 8 weeks  2.  Stair climbing with reciprocally in 8 weeks  3.  Independent with HEP     Plan  Patient would benefit from: skilled physical therapy  Planned modality interventions: cryotherapy  Planned therapy interventions: abdominal trunk stabilization, strengthening, stretching, therapeutic exercise, IASTM, manual therapy, neuromuscular re-education, patient education, home exercise program, functional ROM exercises and flexibility  Frequency: 2x week  Duration in weeks: 12  Treatment plan discussed with: patient        Subjective Evaluation    History of Present Illness  Mechanism of injury: Pt underwent L TKA on  and  "remaining hospitalized over night.  She was d/c home ambulating w/ wheeled walker.  She reports more achiness than pain.  She was able to wash and dress independently today. She is able to prepare light meals.  She does care for  w/ dementia.  Minimal sleep disturbance reported.  Reports most benefit w/ Tylenol.   Patient Goals  Patient goals for therapy: decreased pain, increased motion, increased strength and independence with ADLs/IADLs    Pain  At best pain ratin  At worst pain ratin          Objective     Observations     Additional Observation Details  No sig swelling or redness around incision.  Mild bleeding from one staple in lower 1/3 of incision.  Wrapped w/ gauze    Active Range of Motion   Left Knee   Flexion: 62 degrees   Extension: -19 degrees     Passive Range of Motion   Left Knee   Flexion: 75 degrees   Extension: -12 degrees     Strength/Myotome Testing     Left Hip   Planes of Motion   Flexion: 3+  Abduction: 3+    Left Knee   Flexion: 3-  Extension: 3-      Flowsheet Rows      Flowsheet Row Most Recent Value   PT/OT G-Codes    Current Score 47   Projected Score 61   FOTO information reviewed Yes   Assessment Type Evaluation   G code set Other PT/OT Primary   Other PT Primary Current Status () CK   Other PT Primary Goal Status () CJ               Precautions: none      Manuals             PROM L knee MARLEN                                                   Neuro Re-Ed             SLR flex and ab nv            bridges nv            SAQ nv                                                                Ther Ex /8            Recumbent bike nv            Gastroc strap stretch 30\"X3            Heel slides 10\" x10            Quad set 5\"x20            Adductor squeeze nv                                                   Ther Activity                                       Gait Training                                       Modalities                                        "   Eval/Re-Eval POC Expires Auth #/ Referral # Total Visits Start Date Expiration Date Extension Info Visits Limitation   1/10 4/10 [facility]              2/8 5/8                                                                     1 2 3 4 5 6   1/10 2/8 F            7 8 9 10 11 12                 13 14 15 16 17 18                 19 20 21 22 23 24                 25 26 27 28 29 30

## 2024-02-13 ENCOUNTER — APPOINTMENT (OUTPATIENT)
Dept: PHYSICAL THERAPY | Facility: MEDICAL CENTER | Age: 74
End: 2024-02-13
Payer: MEDICARE

## 2024-02-13 DIAGNOSIS — M17.12 PRIMARY OSTEOARTHRITIS OF LEFT KNEE: ICD-10-CM

## 2024-02-13 RX ORDER — ASCORBIC ACID 500 MG
500 TABLET ORAL 2 TIMES DAILY
Qty: 60 TABLET | Refills: 5 | Status: SHIPPED | OUTPATIENT
Start: 2024-02-13

## 2024-02-16 ENCOUNTER — OFFICE VISIT (OUTPATIENT)
Dept: OBGYN CLINIC | Facility: MEDICAL CENTER | Age: 74
End: 2024-02-16

## 2024-02-16 ENCOUNTER — OFFICE VISIT (OUTPATIENT)
Dept: PHYSICAL THERAPY | Facility: MEDICAL CENTER | Age: 74
End: 2024-02-16
Payer: MEDICARE

## 2024-02-16 ENCOUNTER — APPOINTMENT (OUTPATIENT)
Dept: RADIOLOGY | Facility: MEDICAL CENTER | Age: 74
End: 2024-02-16
Payer: MEDICARE

## 2024-02-16 VITALS
BODY MASS INDEX: 31.85 KG/M2 | SYSTOLIC BLOOD PRESSURE: 130 MMHG | HEIGHT: 59 IN | WEIGHT: 158 LBS | HEART RATE: 86 BPM | DIASTOLIC BLOOD PRESSURE: 77 MMHG

## 2024-02-16 DIAGNOSIS — Z96.652 STATUS POST TOTAL LEFT KNEE REPLACEMENT: Primary | ICD-10-CM

## 2024-02-16 DIAGNOSIS — Z96.652 STATUS POST TOTAL LEFT KNEE REPLACEMENT: ICD-10-CM

## 2024-02-16 DIAGNOSIS — M17.12 PRIMARY OSTEOARTHRITIS OF LEFT KNEE: Primary | ICD-10-CM

## 2024-02-16 PROCEDURE — 99024 POSTOP FOLLOW-UP VISIT: CPT | Performed by: ORTHOPAEDIC SURGERY

## 2024-02-16 PROCEDURE — 97112 NEUROMUSCULAR REEDUCATION: CPT | Performed by: PHYSICAL THERAPIST

## 2024-02-16 PROCEDURE — 97110 THERAPEUTIC EXERCISES: CPT | Performed by: PHYSICAL THERAPIST

## 2024-02-16 PROCEDURE — 73560 X-RAY EXAM OF KNEE 1 OR 2: CPT

## 2024-02-16 PROCEDURE — 97140 MANUAL THERAPY 1/> REGIONS: CPT | Performed by: PHYSICAL THERAPIST

## 2024-02-16 NOTE — PROGRESS NOTES
73 y.o.female 11 days post op from left TKA. Patient reports some discomfort, but her pain is overall very controlled. She is working with PT and has been taking her lovenox.  She has no numbness of the left lower extremity.  She reports some swelling that is improved with elevation.    Review of Systems  Review of systems negative unless otherwise specified in HPI    Past Medical History  Past Medical History:   Diagnosis Date    Asymptomatic menopausal state     Breast lump     Hyperlipemia     Pap smear abnormality of cervix with ASCUS favoring benign     Plantar fasciitis        Past Surgical History  Past Surgical History:   Procedure Laterality Date    CATARACT EXTRACTION Bilateral      SECTION      COLONOSCOPY      [place] ARTHRP KNE CONDYLE&PLATU MEDIAL&LAT COMPARTMENTS Left 2024    Procedure: ARTHROPLASTY KNEE TOTAL W ROBOT;  Surgeon: Lisandro Diez MD;  Location: [facility];  Service: Orthopedics       Current Medications  Current Outpatient Medications on File Prior to Visit   Medication Sig Dispense Refill    acetaminophen (TYLENOL) 650 mg CR tablet Take 1 tablet (650 mg total) by mouth every 8 (eight) hours as needed for mild pain 30 tablet 0    ascorbic acid (VITAMIN C) 500 mg tablet TAKE 1 TABLET BY MOUTH TWICE A DAY 60 tablet 5    atorvastatin (LIPITOR) 20 mg tablet       Calcium Ascorbate (VITAMIN C) 500 mg tablet Take 1 tablet (500 mg total) by mouth 2 (two) times a day 60 tablet 0    cholecalciferol (VITAMIN D3) 1,000 units tablet Take 1 tablet by mouth daily      gabapentin (NEURONTIN) 100 mg capsule       levothyroxine 25 mcg tablet       lisinopril (ZESTRIL) 10 mg tablet       metoprolol succinate (TOPROL-XL) 50 mg 24 hr tablet       enoxaparin (LOVENOX) 40 mg/0.4 mL Inject 0.4 mL (40 mg total) under the skin daily for 28 days To start post op 11.2 mL 0    ferrous sulfate 324 (65 Fe) mg Take 1 tablet (324 mg total) by mouth daily before breakfast Start 30 days prior to surgery.  30 tablet 0    folic acid (FOLVITE) 1 mg tablet Take 1 tablet (1 mg total) by mouth daily Start 30 days prior to surgery. 30 tablet 0    [] oxyCODONE (Roxicodone) 5 immediate release tablet Take 1 tablet (5 mg total) by mouth every 6 (six) hours as needed for moderate pain for up to 10 days Max Daily Amount: 20 mg 20 tablet 0     No current facility-administered medications on file prior to visit.       Recent Labs (HCT,HGB,PT,INR,ESR,CRP,GLU,HgA1C)  0   Lab Value Date/Time    HCT 37.3 2024 0606    HGB 12.3 2024 0606    WBC 12.93 (H) 2024 0606    INR 0.96 2024 1031    HGBA1C 6.1 (H) 2024 1031         Physical exam  General: Awake, Alert, Oriented  Eyes: Pupils equal, round and reactive to light  Heart: regular rate and rhythm  Lungs: No audible wheezing  Abdomen: soft  Left lower extremity  - skin incision well appearing, no induration, erythma or drainage  - TTP medial and lateral knee  - knee ROM 5-85 deg  - motor intact knee flexion/extension  - sensation intact sural, saphenous, tibial, deep and superficial peroneal nerve distribution  - 2+ DP pulse    Imaging  X-rays left knee obtained, reviewed and my independent interpretation is as follows: left total knee arthroplasty with intact hardware in good alignment.    Procedure  None    Assessment/Plan:   73 y.o.female 11 days s/p left TKA.  Patient doing well.      WBAT LLE  Continue PT  Complete lovenox course for DVT ppx  Tylenol prn for pain  Elevate extremity prn for pain  Return to clinic in 1 week for staple removal.

## 2024-02-16 NOTE — PROGRESS NOTES
"Daily Note     Today's date: 2024  Patient name: Carina Bang  : 1950  MRN: [id number]  Referring provider: Lillian Starr,*  Dx:   Encounter Diagnosis     ICD-10-CM    1. Primary osteoarthritis of left knee  M17.12                      Subjective: Pt reports some tightness in her calf this morning.  Most discomfort noted in quad muscle.      Objective: See treatment diary below      Assessment: Tolerated treatment well. Patient demonstrated fatigue post treatment, exhibited good technique with therapeutic exercises, and would benefit from continued PT  Most pain reported at end range extension today.      Plan: Continue per plan of care.      Precautions: none      Manuals            PROM L knee MARLEN 10 min                                                  Neuro Re-Ed            SLR flex and ab nv X20 abd           bridges nv 5\"x20           SAQ nv 5\"x20                                                               Ther Ex            Recumbent bike nv ROM 6 min           Gastroc strap stretch 30\"X3 30\"x3           Heel slides 10\" x10 10\" x10           Quad set 5\"x20 5\"x20           Adductor squeeze nv 5\"x20                                                  Ther Activity                                       Gait Training                                       Modalities                                         Eval/Re-Eval POC Expires Auth #/ Referral # Total Visits Start Date Expiration Date Extension Info Visits Limitation   1/10 4/10 [facility]                                                                                   1 2 3 4 5 6   1/10 2/8 F            7 8 9 10 11 12                 13 14 15 16 17 18                 19 20 21 22 23 24                 25 26 27 28 29 30                       "

## 2024-02-20 ENCOUNTER — OFFICE VISIT (OUTPATIENT)
Dept: PHYSICAL THERAPY | Facility: MEDICAL CENTER | Age: 74
End: 2024-02-20
Payer: MEDICARE

## 2024-02-20 DIAGNOSIS — M17.12 PRIMARY OSTEOARTHRITIS OF LEFT KNEE: Primary | ICD-10-CM

## 2024-02-20 PROCEDURE — 97112 NEUROMUSCULAR REEDUCATION: CPT | Performed by: PHYSICAL THERAPIST

## 2024-02-20 PROCEDURE — 97140 MANUAL THERAPY 1/> REGIONS: CPT | Performed by: PHYSICAL THERAPIST

## 2024-02-20 PROCEDURE — 97110 THERAPEUTIC EXERCISES: CPT | Performed by: PHYSICAL THERAPIST

## 2024-02-20 NOTE — PROGRESS NOTES
"Daily Note     Today's date: 2024  Patient name: Carina Bang  : 1950  MRN: [id number]  Referring provider: Lillian Starr,*  Dx:   Encounter Diagnosis     ICD-10-CM    1. Primary osteoarthritis of left knee  M17.12                      Subjective: Pt reports slight increase in stiffness today.      Objective: See treatment diary below      Assessment: Tolerated treatment well. Patient demonstrated fatigue post treatment, exhibited good technique with therapeutic exercises, and would benefit from continued PT  Most pain persisting w/ extension.      Plan: Continue per plan of care.      Precautions: none      Manuals           PROM L knee MARLEN 10 min 10 min                                                 Neuro Re-Ed           SLR flex and ab nv X20 abd X20 abd          bridges nv 5\"x20 5\"x20          SAQ nv 5\"x20 5\"x20                                                              Ther Ex           Recumbent bike nv ROM 6 min ROM 7 min          Gastroc strap stretch 30\"X3 30\"x3 30\"x3          Heel slides 10\" x10 10\" x10 10\" x10          Quad set 5\"x20 5\"x20           Adductor squeeze nv 5\"x20 5\"x20                                                 Ther Activity                                       Gait Training                                       Modalities                                         Eval/Re-Eval POC Expires Auth #/ Referral # Total Visits Start Date Expiration Date Extension Info Visits Limitation   1/10 4/10 [facility]                                                                                   1 2 3 4 5 6   1/10 2/8 F          7 8 9 10 11 12                 13 14 15 16 17 18                 19 20 21 22 23 24                 25 26 27 28 29 30                       "

## 2024-02-21 PROBLEM — Z01.419 ENCOUNTER FOR GYNECOLOGICAL EXAMINATION (GENERAL) (ROUTINE) WITHOUT ABNORMAL FINDINGS: Status: RESOLVED | Noted: 2020-10-26 | Resolved: 2024-02-21

## 2024-02-22 ENCOUNTER — OFFICE VISIT (OUTPATIENT)
Dept: PHYSICAL THERAPY | Facility: MEDICAL CENTER | Age: 74
End: 2024-02-22
Payer: MEDICARE

## 2024-02-22 DIAGNOSIS — M17.12 PRIMARY OSTEOARTHRITIS OF LEFT KNEE: Primary | ICD-10-CM

## 2024-02-22 PROCEDURE — 97112 NEUROMUSCULAR REEDUCATION: CPT | Performed by: PHYSICAL THERAPIST

## 2024-02-22 PROCEDURE — 97140 MANUAL THERAPY 1/> REGIONS: CPT | Performed by: PHYSICAL THERAPIST

## 2024-02-22 NOTE — PROGRESS NOTES
"Daily Note     Today's date: 2024  Patient name: Carina Bang  : 1950  MRN: [id number]  Referring provider: Lillian Starr,*  Dx:   Encounter Diagnosis     ICD-10-CM    1. Primary osteoarthritis of left knee  M17.12                      Subjective:  Pt reports most soreness in quad      Objective: See treatment diary below      Assessment: Tolerated treatment well. Patient demonstrated fatigue post treatment, exhibited good technique with therapeutic exercises, and would benefit from continued PT  Progressing very well; ambulating w/ SPC.   Pain does persist at end ranges.        Plan: Continue per plan of care.      Precautions: none      Manuals          PROM L knee MARLEN 10 min 10 min 10 min                                                Neuro Re-Ed          SLR flex and ab nv X20 abd X20 abd X20 ea         bridges nv 5\"x20 5\"x20 5\"x20         SAQ nv 5\"x20 5\"x20 np         LAQ    5\"x20                                                Ther Ex          Recumbent bike nv ROM 6 min ROM 7 min ROM 7min         Gastroc strap stretch 30\"X3 30\"x3 30\"x3 30\"x3         Heel slides 10\" x10 10\" x10 10\" x10          Quad set 5\"x20 5\"x20           Adductor squeeze nv 5\"x20 5\"x20 5\"x20         Hamstring stretch    30\"x3                                   Ther Activity                                       Gait Training                                       Modalities                                         Eval/Re-Eval POC Expires Auth #/ Referral # Total Visits Start Date Expiration Date Extension Info Visits Limitation   1/10 4/10 [facility]                                                                                   1 2 3 4 5 6   1/10 2/8 F        7 8 9 10 11 12                 13 14 15 16 17 18                 19 20 21 22 23 24                 25 26 27 28 29 30                       "

## 2024-02-23 ENCOUNTER — OFFICE VISIT (OUTPATIENT)
Dept: OBGYN CLINIC | Facility: MEDICAL CENTER | Age: 74
End: 2024-02-23

## 2024-02-23 VITALS
HEIGHT: 59 IN | WEIGHT: 158 LBS | SYSTOLIC BLOOD PRESSURE: 131 MMHG | HEART RATE: 90 BPM | DIASTOLIC BLOOD PRESSURE: 78 MMHG | BODY MASS INDEX: 31.85 KG/M2

## 2024-02-23 DIAGNOSIS — Z96.652 STATUS POST TOTAL LEFT KNEE REPLACEMENT: Primary | ICD-10-CM

## 2024-02-23 PROCEDURE — 99024 POSTOP FOLLOW-UP VISIT: CPT | Performed by: ORTHOPAEDIC SURGERY

## 2024-02-23 NOTE — PROGRESS NOTES
Assessment:   Diagnosis ICD-10-CM Associated Orders   1. Status post total left knee replacement  Z96.652           Plan:  73 year old female 2 weeks s/p left TKA  - Continue physical therapy   - Continue physical activity as tolerated   - Continue and complete course of Lovenox   - Able to drive if able to get in/out of car without pain and feels safe doing so  - Advised to take tylenol PM due to inability to sleep at night     Diagnostics reviewed and physical exam performed.  Diagnosis, treatment options and associated risks were discussed with the patient including no treatment, nonsurgical treatment and potential for surgical intervention.  The patient was given the opportunity to ask questions regarding each.        To do next visit:  Return in about 4 weeks (around 3/22/2024) for 6 week post-op appointment .    The above stated was discussed in layman's terms and the patient expressed understanding.  All questions were answered to the patient's satisfaction.         Subjective:   Carina Bang is a 73 y.o. female who presents 2 weeks s/p left total knee arthroplasty.  She is doing very well.  She denies pain, but does admit to some soreness.  Patient admits to attending physical therapy and doing well.  She presents today using SPC without difficulty.  Taking lovenox daily. Staples removed in office today, incision cleaned, steri-strips applied.       Review of systems negative unless otherwise specified in HPI    Past Medical History:   Diagnosis Date    Asymptomatic menopausal state     Breast lump     Hyperlipemia     Pap smear abnormality of cervix with ASCUS favoring benign     Plantar fasciitis        Past Surgical History:   Procedure Laterality Date    CATARACT EXTRACTION Bilateral      SECTION      COLONOSCOPY      [place] ARTHRP KNE CONDYLE&PLATU MEDIAL&LAT COMPARTMENTS Left 2024    Procedure: ARTHROPLASTY KNEE TOTAL W ROBOT;  Surgeon: Lisandro Diez MD;  Location: [facility];   Service: Orthopedics       Family History   Problem Relation Age of Onset    Breast cancer Sister         Diagnosed in her 40s    No Known Problems Mother     No Known Problems Father     No Known Problems Maternal Grandmother     No Known Problems Maternal Grandfather     No Known Problems Paternal Grandmother     No Known Problems Paternal Grandfather     No Known Problems Son     No Known Problems Son        Social History     Occupational History    Occupation: Retired   Tobacco Use    Smoking status: Never    Smokeless tobacco: Never   Vaping Use    Vaping status: Never Used   Substance and Sexual Activity    Alcohol use: Yes     Comment: remote social alcohol use    Drug use: No    Sexual activity: Yes     Partners: Male     Birth control/protection: Post-menopausal         Current Outpatient Medications:     acetaminophen (TYLENOL) 650 mg CR tablet, Take 1 tablet (650 mg total) by mouth every 8 (eight) hours as needed for mild pain, Disp: 30 tablet, Rfl: 0    ascorbic acid (VITAMIN C) 500 mg tablet, TAKE 1 TABLET BY MOUTH TWICE A DAY, Disp: 60 tablet, Rfl: 5    atorvastatin (LIPITOR) 20 mg tablet, , Disp: , Rfl:     cholecalciferol (VITAMIN D3) 1,000 units tablet, Take 1 tablet by mouth daily, Disp: , Rfl:     gabapentin (NEURONTIN) 100 mg capsule, , Disp: , Rfl:     levothyroxine 25 mcg tablet, , Disp: , Rfl:     lisinopril (ZESTRIL) 10 mg tablet, , Disp: , Rfl:     metoprolol succinate (TOPROL-XL) 50 mg 24 hr tablet, , Disp: , Rfl:     Calcium Ascorbate (VITAMIN C) 500 mg tablet, Take 1 tablet (500 mg total) by mouth 2 (two) times a day, Disp: 60 tablet, Rfl: 0    enoxaparin (LOVENOX) 40 mg/0.4 mL, Inject 0.4 mL (40 mg total) under the skin daily for 28 days To start post op, Disp: 11.2 mL, Rfl: 0    ferrous sulfate 324 (65 Fe) mg, Take 1 tablet (324 mg total) by mouth daily before breakfast Start 30 days prior to surgery., Disp: 30 tablet, Rfl: 0    folic acid (FOLVITE) 1 mg tablet, Take 1 tablet (1 mg  "total) by mouth daily Start 30 days prior to surgery., Disp: 30 tablet, Rfl: 0    Allergies   Allergen Reactions    Penicillins Other (See Comments)     Childhood   slept a long time  3 yrs old??            Vitals:    02/23/24 1350   BP: 131/78   Pulse: 90       Objective:  Physical exam  General: Awake, Alert, Oriented  Eyes: Pupils equal, round and reactive to light  Heart: regular rate and rhythm  Lungs: No audible wheezing  Abdomen: soft                    Ortho Exam  LLE:   Well healing anterior incision  Staples removed in office today, incision cleaned, steri-strips applied   No erythema and no ecchymosis  Appropriate swelling of lower limb  Appropriate warmth of knee  Extensor mechanism intact  Extension 0  Flexion 85  Calf compartments soft and supple  Sensation intact  Toes are warm sensate and mobile     Diagnostics, reviewed and taken today if performed as documented:    None performed        Procedures, if performed today:    None performed      Portions of the record may have been created with voice recognition software.  Occasional wrong word or \"sound a like\" substitutions may have occurred due to the inherent limitations of voice recognition software.  Read the chart carefully and recognize, using context, where substitutions have occurred.      "

## 2024-02-27 ENCOUNTER — OFFICE VISIT (OUTPATIENT)
Dept: PHYSICAL THERAPY | Facility: MEDICAL CENTER | Age: 74
End: 2024-02-27
Payer: MEDICARE

## 2024-02-27 DIAGNOSIS — M17.12 PRIMARY OSTEOARTHRITIS OF LEFT KNEE: Primary | ICD-10-CM

## 2024-02-27 PROCEDURE — 97140 MANUAL THERAPY 1/> REGIONS: CPT | Performed by: PHYSICAL THERAPIST

## 2024-02-27 PROCEDURE — 97112 NEUROMUSCULAR REEDUCATION: CPT | Performed by: PHYSICAL THERAPIST

## 2024-02-27 PROCEDURE — 97110 THERAPEUTIC EXERCISES: CPT | Performed by: PHYSICAL THERAPIST

## 2024-02-27 NOTE — PROGRESS NOTES
"Daily Note     Today's date: 2024  Patient name: Carina Bang  : 1950  MRN: [id number]  Referring provider: Lillian Starr,*  Dx:   Encounter Diagnosis     ICD-10-CM    1. Primary osteoarthritis of left knee  M17.12                      Subjective:  Pt presents ambulating w/ SPC; states she is often not using AD within the house        Objective: See treatment diary below      Assessment: Tolerated treatment well. Patient demonstrated fatigue post treatment, exhibited good technique with therapeutic exercises, and would benefit from continued PT  Most pain at end range extension.    Added HR and mini squats w/ good tolerance.        Plan: Continue per plan of care.      Precautions: none      Manuals         PROM L knee MARLEN 10 min 10 min 10 min 10 min                                               Neuro Re-Ed         SLR flex and ab nv X20 abd X20 abd X20 ea X20 ea        bridges nv 5\"x20 5\"x20 5\"x20 5\"x20        SAQ nv 5\"x20 5\"x20 np         LAQ    5\"x20 5\"x20        Mini squats     x20                                  Ther Ex         Recumbent bike nv ROM 6 min ROM 7 min ROM 7min 10 min        Gastroc strap stretch 30\"X3 30\"x3 30\"x3 30\"x3 30\"x3        Heel slides 10\" x10 10\" x10 10\" x10          Quad set 5\"x20 5\"x20           Adductor squeeze nv 5\"x20 5\"x20 5\"x20 5\"x20        Hamstring stretch    30\"x3 30\"x3        HR     x20                     Ther Activity                                       Gait Training                                       Modalities                                         Eval/Re-Eval POC Expires Auth #/ Referral # Total Visits Start Date Expiration Date Extension Info Visits Limitation   1/10 4/10 [facility]                                                                                   1 2 3 4 5 6   1/10 2/8 F      7 8 9 10 11 12                 13 14 15 16 17 18         "         19 20 21 22 23 24                 25 26 27 28 29 30

## 2024-02-29 ENCOUNTER — OFFICE VISIT (OUTPATIENT)
Dept: PHYSICAL THERAPY | Facility: MEDICAL CENTER | Age: 74
End: 2024-02-29
Payer: MEDICARE

## 2024-02-29 DIAGNOSIS — M17.12 PRIMARY OSTEOARTHRITIS OF LEFT KNEE: Primary | ICD-10-CM

## 2024-02-29 PROCEDURE — 97112 NEUROMUSCULAR REEDUCATION: CPT | Performed by: PHYSICAL THERAPIST

## 2024-02-29 PROCEDURE — 97140 MANUAL THERAPY 1/> REGIONS: CPT | Performed by: PHYSICAL THERAPIST

## 2024-02-29 PROCEDURE — 97110 THERAPEUTIC EXERCISES: CPT | Performed by: PHYSICAL THERAPIST

## 2024-02-29 NOTE — PROGRESS NOTES
"Daily Note     Today's date: 2024  Patient name: Carina Bang  : 1950  MRN: [id number]  Referring provider: Lillian Starr,*  Dx:   Encounter Diagnosis     ICD-10-CM    1. Primary osteoarthritis of left knee  M17.12                      Subjective:  Pt w/ no complaints prior to treatment.        Objective: See treatment diary below      Assessment: Tolerated treatment well. Patient demonstrated fatigue post treatment, exhibited good technique with therapeutic exercises, and would benefit from continued PT  Some pain initially w/ bike today.  She is progressing very well.          Plan: Continue per plan of care.      Precautions: none      Manuals        PROM L knee MARLEN 10 min 10 min 10 min 10 min 10 min                                              Neuro Re-Ed        SLR flex and ab nv X20 abd X20 abd X20 ea X20 ea x20       bridges nv 5\"x20 5\"x20 5\"x20 5\"x20 5\"x20       SAQ nv 5\"x20 5\"x20 np         LAQ    5\"x20 5\"x20 5\"x20       Mini squats     x20 X20       Step ups      6IN X20                    Ther Ex        Recumbent bike nv ROM 6 min ROM 7 min ROM 7min 10 min 10 min       Gastroc strap stretch 30\"X3 30\"x3 30\"x3 30\"x3 30\"x3 30\"x3       Heel slides 10\" x10 10\" x10 10\" x10          Quad set 5\"x20 5\"x20           Adductor squeeze nv 5\"x20 5\"x20 5\"x20 5\"x20        Hamstring stretch    30\"x3 30\"x3 30\"x2       HR     x20 x20                    Ther Activity                                       Gait Training                                       Modalities                                         Eval/Re-Eval POC Expires Auth #/ Referral # Total Visits Start Date Expiration Date Extension Info Visits Limitation   1/10 4/10 [facility]                                                                                   1 2 3 4 5 6   1/10 2/8 F      7 8 9 10 11 12                 13 14 " 15 16 17 18                 19 20 21 22 23 24                 25 26 27 28 29 30

## 2024-03-05 ENCOUNTER — OFFICE VISIT (OUTPATIENT)
Dept: PHYSICAL THERAPY | Facility: MEDICAL CENTER | Age: 74
End: 2024-03-05
Payer: MEDICARE

## 2024-03-05 DIAGNOSIS — M17.12 PRIMARY OSTEOARTHRITIS OF LEFT KNEE: Primary | ICD-10-CM

## 2024-03-05 PROCEDURE — 97112 NEUROMUSCULAR REEDUCATION: CPT | Performed by: PHYSICAL THERAPIST

## 2024-03-05 PROCEDURE — 97140 MANUAL THERAPY 1/> REGIONS: CPT | Performed by: PHYSICAL THERAPIST

## 2024-03-05 NOTE — PROGRESS NOTES
"Daily Note     Today's date: 3/5/2024  Patient name: Carina Bang  : 1950  MRN: [id number]  Referring provider: Lillian Starr,*  Dx:   Encounter Diagnosis     ICD-10-CM    1. Primary osteoarthritis of left knee  M17.12                      Subjective:  Pt reports increased pain and stiffness yesterday for unknown reason.  Today she is feeling better.        Objective: See treatment diary below      Assessment: Tolerated treatment well. Patient demonstrated fatigue post treatment, exhibited good technique with therapeutic exercises, and would benefit from continued PT  ROM improving; w/ less pain at end range extension.      Plan: Continue per plan of care.      Precautions: none      Manuals  3/      PROM L knee MARLEN 10 min 10 min 10 min 10 min 10 min 10 min                                             Neuro Re-Ed  3/5      SLR flex and ab nv X20 abd X20 abd X20 ea X20 ea x20 x20      bridges nv 5\"x20 5\"x20 5\"x20 5\"x20 5\"x20 5\"x20      SAQ nv 5\"x20 5\"x20 np         LAQ    5\"x20 5\"x20 5\"x20 5\"x20      Mini squats     x20 X20 x20      Step ups fwd and lat      6IN X20 fwd 6in x20 ea                   Ther Ex  3/5      Recumbent bike nv ROM 6 min ROM 7 min ROM 7min 10 min 10 min 10 min      Gastroc strap stretch 30\"X3 30\"x3 30\"x3 30\"x3 30\"x3 30\"x3 30\"x3                                             Hamstring stretch    30\"x3 30\"x3 30\"x3 30\"x3      HR     x20 x20                    Ther Activity                                       Gait Training                                       Modalities                                         Eval/Re-Eval POC Expires Auth #/ Referral # Total Visits Start Date Expiration Date Extension Info Visits Limitation   1/10 4/10 [facility]/AARP                                                                                   1 2 3 4 5 6   1/10 2/8 F      7 8 9 10 11 12 "   2/29  3/5            13 14 15 16 17 18                 19 20 21 22 23 24                 25 26 27 28 29 30

## 2024-03-07 ENCOUNTER — OFFICE VISIT (OUTPATIENT)
Dept: PHYSICAL THERAPY | Facility: MEDICAL CENTER | Age: 74
End: 2024-03-07
Payer: MEDICARE

## 2024-03-07 DIAGNOSIS — M17.12 PRIMARY OSTEOARTHRITIS OF LEFT KNEE: Primary | ICD-10-CM

## 2024-03-07 PROCEDURE — 97110 THERAPEUTIC EXERCISES: CPT | Performed by: PHYSICAL THERAPIST

## 2024-03-07 PROCEDURE — 97140 MANUAL THERAPY 1/> REGIONS: CPT | Performed by: PHYSICAL THERAPIST

## 2024-03-07 PROCEDURE — 97112 NEUROMUSCULAR REEDUCATION: CPT | Performed by: PHYSICAL THERAPIST

## 2024-03-07 NOTE — PROGRESS NOTES
"Daily Note     Today's date: 3/7/2024  Patient name: Carina Bang  : 1950  MRN: [id number]  Referring provider: Lillian Starr,*  Dx:   Encounter Diagnosis     ICD-10-CM    1. Primary osteoarthritis of left knee  M17.12                      Subjective:  Pt reports increased pain since yesterday; mostly located at inferior lateral aspect of knee.        Objective: See treatment diary below      Assessment: Tolerated treatment well. Patient demonstrated fatigue post treatment, exhibited good technique with therapeutic exercises, and would benefit from continued PT  Despite increased pain, no loss of ROM noted.  Increased pain w/ abduction ex today.  Increased pain may be due to overall increase in activity levels.  Advised to use ice and rest as needed.      Plan: Continue per plan of care.      Precautions: none      Manuals 2/8 2/16 2/20 2/22 2/27 2/29 3/5 3/7     PROM L knee MARLEN 10 min 10 min 10 min 10 min 10 min 10 min 10 min                                            Neuro Re-Ed 2/8 2/16 2/20 2/22 2/27 2/29 3/5 3/7     SLR flex and ab nv X20 abd X20 abd X20 ea X20 ea x20 x20 X20 flex, x7 abd     bridges nv 5\"x20 5\"x20 5\"x20 5\"x20 5\"x20 5\"x20 5\"x20     SAQ nv 5\"x20 5\"x20 np         LAQ    5\"x20 5\"x20 5\"x20 5\"x20 5\"x20     Mini squats     x20 X20 x20 x20     Step ups fwd and lat      6IN X20 fwd 6in x20 ea held                  Ther Ex 2/8 2/16 2/20 2/22 2/27 2/29 3/5 3/7     Recumbent bike nv ROM 6 min ROM 7 min ROM 7min 10 min 10 min 10 min 10 min     Gastroc strap stretch 30\"X3 30\"x3 30\"x3 30\"x3 30\"x3 30\"x3 30\"x3 30\"x3                                            Hamstring stretch    30\"x3 30\"x3 30\"x3 30\"x3 30\"x3     HR     x20 x20  x20                  Ther Activity                                       Gait Training                                       Modalities                                         Eval/Re-Eval POC Expires Auth #/ Referral # Total Visits Start Date Expiration Date Extension " Info Visits Limitation   1/10 4/10 [facility]/A[facility]              2/8 5/8                                                                     1 2 3 4 5 6   1/10 2/8 F  2/16 2/20 2/22 2/27    7 8 9 10 11 12   2/29  3/5   3/7         13 14 15 16 17 18                 19 20 21 22 23 24                 25 26 27 28 29 30

## 2024-03-12 ENCOUNTER — EVALUATION (OUTPATIENT)
Dept: PHYSICAL THERAPY | Facility: MEDICAL CENTER | Age: 74
End: 2024-03-12
Payer: MEDICARE

## 2024-03-12 DIAGNOSIS — M17.12 PRIMARY OSTEOARTHRITIS OF LEFT KNEE: Primary | ICD-10-CM

## 2024-03-12 PROCEDURE — 97110 THERAPEUTIC EXERCISES: CPT | Performed by: PHYSICAL THERAPIST

## 2024-03-12 PROCEDURE — 97112 NEUROMUSCULAR REEDUCATION: CPT | Performed by: PHYSICAL THERAPIST

## 2024-03-12 PROCEDURE — 97140 MANUAL THERAPY 1/> REGIONS: CPT | Performed by: PHYSICAL THERAPIST

## 2024-03-12 NOTE — PROGRESS NOTES
PT Re-Evaluation     Today's date: 3/12/2024  Patient name: Carina Bang  : 1950  MRN: [id number]  Referring provider: Lillian Starr,*  Dx:   Encounter Diagnosis     ICD-10-CM    1. Primary osteoarthritis of left knee  M17.12                      Assessment  Assessment details: Carina Bang has attended 9 visits since initiating PT post-operatively and has demonstrated overall improvement.  Mobility and strength has improved with decreased reports of pain. Carina Bang has demonstrated an improvement in function as well.  Currently, she has made steady progress towards her goals, but continue to remain limited with standing and walking after sitting for 30 minutes or longer. At times she demonstrates antalgic gait.  New exacerbation appears to be due to ITB and peroneal tightness.  Assess response to graston.   At this time, continued physical therapy is recommended in order to address their remaining impairments in effort to further improve function.      Impairments: abnormal gait, abnormal muscle tone, abnormal or restricted ROM, abnormal movement, impaired physical strength, lacks appropriate home exercise program, pain with function and weight-bearing intolerance  Understanding of Dx/Px/POC: good   Prognosis: good    Goals  Short Term Goals:   1.  Pain decreased 2 subjective ratings in 4 weeks PARTIALLY MET  2.  Increased knee flexion 10* in 4 weeks MET  3.  Increased knee extension 5* in 4 weeks MET  4.  Strength improved 1/2 grade in 4 weeks MET    Long Term Goals:  1.  Ambulating without AD 8 weeks PARTIALLY MET  2.  Stair climbing with reciprocally in 8 weeks PARTIALLY MET  3.  Independent with HEP PARTIALLY MET    Plan  Patient would benefit from: skilled physical therapy  Planned modality interventions: cryotherapy  Planned therapy interventions: abdominal trunk stabilization, strengthening, stretching, therapeutic exercise, IASTM, manual therapy, neuromuscular re-education, patient  "education, home exercise program, functional ROM exercises and flexibility  Frequency: 2x week  Duration in weeks: 8  Treatment plan discussed with: patient        Subjective Evaluation    History of Present Illness  Mechanism of injury: Pt had been progressing very well.  She began to experience increased pain and stiffness last week for unknown reason.  Most pain is located along lateral aspect of knee and is most severe after sitting for 30 minutes or longer.  She does c/o sleep disturbance.  She has resumed most household chores.  At times uses [facility].  She does care for  w/ dementia.    Patient Goals  Patient goals for therapy: decreased pain, increased motion, increased strength and independence with ADLs/IADLs    Pain  At best pain ratin  At worst pain ratin          Objective     Palpation     Additional Palpation Details  Significant restrictions noted today along distal ITB and proximal peroneals.    Active Range of Motion   Left Knee   Flexion: 95 degrees   Extension: -10 degrees     Passive Range of Motion   Left Knee   Flexion: 100 degrees   Extension: -5 degrees     Strength/Myotome Testing     Left Hip   Planes of Motion   Flexion: 4+  Abduction: 4+    Left Knee   Flexion: 4  Extension: 5      Flowsheet Rows      Flowsheet Row Most Recent Value   PT/OT G-Codes    Current Score 61   Projected Score 61   Assessment Type Re-evaluation   G code set Other PT/OT Primary   Other PT Primary Current Status () CJ   Other PT Primary Goal Status () CJ                 Precautions: none      Manuals 3/12            PROM L knee 10 MIN            Graston L distal ITB and prox peroneals 5 min                                      Neuro Re-Ed 3/12            SLR flex and ab X20 ea            bridges 5\"x20            LAQ 5\"x20            Mini squats np            Step ups fwd and lat np                                      Ther Ex 3/12            Recumbent bike 10 min            Gastroc strap " "stretch 30\"X3            Hamstring strap stretch 30\"x3            ITB stretch 30\"x2            HR np                                                   Ther Activity                                       Gait Training                                       Modalities                                          Eval/Re-Eval POC Expires Auth #/ Referral # Total Visits Start Date Expiration Date Extension Info Visits Limitation   1/10 4/10 [facility]              2/8 5/8                3/12 5/12                                                   1 2 3 4 5 6   1/10 2/8 F  2/16 2/20 2/22 2/27    7 8 9 10 11 12   2/29  3/5   3/7 3/12  F       13 14 15 16 17 18                 19 20 21 22 23 24                 25 26 27 28 29 30                       "

## 2024-03-14 ENCOUNTER — OFFICE VISIT (OUTPATIENT)
Dept: PHYSICAL THERAPY | Facility: MEDICAL CENTER | Age: 74
End: 2024-03-14
Payer: MEDICARE

## 2024-03-14 DIAGNOSIS — M17.12 PRIMARY OSTEOARTHRITIS OF LEFT KNEE: Primary | ICD-10-CM

## 2024-03-14 PROCEDURE — 97110 THERAPEUTIC EXERCISES: CPT | Performed by: PHYSICAL THERAPIST

## 2024-03-14 PROCEDURE — 97140 MANUAL THERAPY 1/> REGIONS: CPT | Performed by: PHYSICAL THERAPIST

## 2024-03-14 PROCEDURE — 97112 NEUROMUSCULAR REEDUCATION: CPT | Performed by: PHYSICAL THERAPIST

## 2024-03-14 NOTE — PROGRESS NOTES
"Daily Note     Today's date: 3/14/2024  Patient name: Carina Bang  : 1950  MRN: [id number]  Referring provider: Lillian Starr,*  Dx:   Encounter Diagnosis     ICD-10-CM    1. Primary osteoarthritis of left knee  M17.12                      Subjective: Pt reports improvement following initiation of graston last visit.        Objective: See treatment diary below      Assessment: Tolerated treatment well. Patient demonstrated fatigue post treatment, exhibited good technique with therapeutic exercises, and would benefit from continued PT  Able to resume weight bearing ex today.    Plan: Continue per plan of care.      Precautions: none      Manuals 3/12 3/14           PROM L knee 10 MIN 10 min           Graston L distal ITB and prox peroneals 5 min 10 min                                     Neuro Re-Ed 3/12 3/14           SLR flex and ab X20 ea x20           bridges 5\"x20 5\"x20           LAQ 5\"x20 5\"x20           Mini squats np x20           Step ups fwd and lat np 6in x20 ea                                     Ther Ex 3/12 3/14           Recumbent bike 10 min 10 min           Gastroc strap stretch 30\"X3 30\"x3           Hamstring strap stretch 30\"x3 30\"x3           ITB stretch 30\"x2 30\"x3           HR np                                                   Ther Activity                                       Gait Training                                       Modalities                                            Eval/Re-Eval POC Expires Auth #/ Referral # Total Visits Start Date Expiration Date Extension Info Visits Limitation   1/10 4/10 [facility]/AARP              2/8 5/8                3/12 5/12                                                   1 2 3 4 5 6   1/10 2/8 F      7 8 9 10 11 12   2/29  3/5   3/7 3/12  F  3/14     13 14 15 16 17 18                 19 20 21 22 23 24                 25 26 27 28 29 30                    "

## 2024-03-18 ENCOUNTER — OFFICE VISIT (OUTPATIENT)
Dept: PHYSICAL THERAPY | Facility: MEDICAL CENTER | Age: 74
End: 2024-03-18
Payer: MEDICARE

## 2024-03-18 DIAGNOSIS — M17.12 PRIMARY OSTEOARTHRITIS OF LEFT KNEE: Primary | ICD-10-CM

## 2024-03-18 PROCEDURE — 97110 THERAPEUTIC EXERCISES: CPT

## 2024-03-18 PROCEDURE — 97140 MANUAL THERAPY 1/> REGIONS: CPT

## 2024-03-18 PROCEDURE — 97112 NEUROMUSCULAR REEDUCATION: CPT

## 2024-03-18 NOTE — PROGRESS NOTES
"Daily Note     Today's date: 3/18/2024  Patient name: Carina Bnag  : 1950  MRN: [id number]  Referring provider: Lillian Starr,*  Dx:   Encounter Diagnosis     ICD-10-CM    1. Primary osteoarthritis of left knee  M17.12                      Subjective: Patient states she feels she is getting better but she may have overdid it earlier today walking at the grocery store. She has attempted steps leading with her L LE and she states it just feels weak not painful.       Objective: See treatment diary below      Assessment: Tolerated treatment well. She performs outlined program with no c/o pain or discomfort. She is limited in flexion and extension but this is improving. Rocking on bike as sci fit was in use and angle was more upright making full rev difficult. Added step up and overs which she performed with ease. Challenged her with stair negotiation. Cues for step pattern with good carryover. Discussed performing steps as she has been at home until she gains enough strength to feel more comfortable and safe. Patient demonstrated fatigue post treatment and would benefit from continued PT      Plan: Progress treatment as tolerated.       Precautions: none      Manuals 3/12 3/14 3/18          PROM L knee 10 MIN 10 min 10 min          Graston L distal ITB and prox peroneals 5 min 10 min Declined                                     Neuro Re-Ed 3/12 3/14 3/18           SLR flex and ab X20 ea x20 X20 ea          bridges 5\"x20 5\"x20 5\" x20          LAQ 5\"x20 5\"x20 5\"x20           Mini squats np x20           Step ups fwd and lat np 6in x20 ea 6 in x20 ea           Step up and over   4\" 10x                       Ther Ex 3/12 3/14 3/18          Recumbent bike 10 min 10 min 5'/5\" holds rocking          Gastroc strap stretch 30\"X3 30\"x3 30\"x3          Hamstring strap stretch 30\"x3 30\"x3 30\"x3          ITB stretch 30\"x2 30\"x3 30\"x3          HR np                                                   Ther Activity   "                                     Gait Training             Stair negotiation   2x reciprocal R handrail only                       Modalities                                           Eval/Re-Eval POC Expires Auth #/ Referral # Total Visits Start Date Expiration Date Extension Info Visits Limitation   1/10 4/10 [facility]/AARP              2/8 5/8                3/12 5/12                                                   1 2 3 4 5 6   1/10 2/8 F  2/16 2/20 2/22 2/27    7 8 9 10 11 12   2/29  3/5   3/7 3/12  F  3/14  3/18   13 14 15 16 17 18                 19 20 21 22 23 24                 25 26 27 28 29 30

## 2024-03-20 ENCOUNTER — HOSPITAL ENCOUNTER (OUTPATIENT)
Dept: RADIOLOGY | Facility: MEDICAL CENTER | Age: 74
Discharge: HOME | End: 2024-03-20
Payer: MEDICARE

## 2024-03-20 VITALS — BODY MASS INDEX: 31.85 KG/M2 | HEIGHT: 59 IN | WEIGHT: 158 LBS

## 2024-03-20 DIAGNOSIS — Z12.31 ENCOUNTER FOR SCREENING MAMMOGRAM FOR MALIGNANT NEOPLASM OF BREAST: ICD-10-CM

## 2024-03-20 DIAGNOSIS — Z12.31 SCREENING MAMMOGRAM FOR BREAST CANCER: ICD-10-CM

## 2024-03-20 PROCEDURE — 77067 SCR MAMMO BI INCL CAD: CPT

## 2024-03-20 PROCEDURE — 77063 BREAST TOMOSYNTHESIS BI: CPT

## 2024-03-21 ENCOUNTER — OFFICE VISIT (OUTPATIENT)
Dept: PHYSICAL THERAPY | Facility: MEDICAL CENTER | Age: 74
End: 2024-03-21
Payer: MEDICARE

## 2024-03-21 DIAGNOSIS — M17.12 PRIMARY OSTEOARTHRITIS OF LEFT KNEE: Primary | ICD-10-CM

## 2024-03-21 PROCEDURE — 97110 THERAPEUTIC EXERCISES: CPT

## 2024-03-21 PROCEDURE — 97112 NEUROMUSCULAR REEDUCATION: CPT

## 2024-03-21 PROCEDURE — 97140 MANUAL THERAPY 1/> REGIONS: CPT

## 2024-03-21 NOTE — PROGRESS NOTES
"  Assessment & Plan     Chronic neck pain  Cervical radiculopathy  Concussion without loss of consciousness, subsequent encounter  Intermittent post-traumatic headache  Patient continues with chronic neck pain and cervical radiculopathy down both arms the right arm more than the left but the left is now involved as well.  Will have her see physical therapy and MRI of the cervical spine to be done in the near future.  It was noted that she had an abnormal MRI of the soft tissue related to pituitary structure in August 2023 and we are repeating that to see if this is real or not.  She was told that it may have been an error on the part of her MRI since she fell asleep during the MRI and may have moved.  - Physical Therapy Referral; Future  - MR Cervical Spine w/o Contrast; Future  - diazepam (VALIUM) 10 MG tablet; Take 1 tablet (10 mg) by mouth every 6 hours as needed for anxiety (MRI)  - MR Soft Tissue Neck w/o & w Contrast; Future    Screening for HIV (human immunodeficiency virus)  Need for hepatitis C screening test  Declined screening consider self low risk.    Cervical cancer screening  Advised full physical in the near future.    Screening for STDs (sexually transmitted diseases)  Advised that she do this with a full physical in the near future.     BMI:   Estimated body mass index is 32.35 kg/m  as calculated from the following:    Height as of this encounter: 1.712 m (5' 7.4\").    Weight as of this encounter: 94.8 kg (209 lb).   Weight management plan: Discussed healthy diet and exercise guidelines    Pete Mireles PA-C  [facility] FRANCISCO JAVIER Lora is a 22 year old, presenting for the following health issues:  hospital follow up       1/4/2024     9:44 AM   Additional Questions   Roomed by Kimberlee de león   Accompanied by Self       History of Present Illness       Reason for visit:  Hospital follow up    She eats 0-1 servings of fruits and vegetables daily.She consumes 2 sweetened " "Daily Note     Today's date: 3/21/2024  Patient name: Carina Bang  : 1950  MRN: [id number]  Referring provider: Lillian Starr,*  Dx:   Encounter Diagnosis     ICD-10-CM    1. Primary osteoarthritis of left knee  M17.12                      Subjective: Patient reports a distal lateral know and states she may have overdone it earlier. She felt fine after her last session.       Objective: See treatment diary below      Assessment: Tolerated treatment well. Tender lateral aspect of knee, feels relief following STM. ROM slowly improving with discomfort at available end ranges. No progression due to subjective. She is doing well on steps but does require UE support with some heavier reliance when descending. Patient demonstrated fatigue post treatment and would benefit from continued PT to return to prior level of function.       Plan: Progress treatment as tolerated.       Precautions: none      Manuals 3/12 3/14 3/18 3/21         PROM L knee 10 MIN 10 min 10 min 10 min         Graston L distal ITB and prox peroneals 5 min 10 min Declined  STM  5 min                                    Neuro Re-Ed 3/12 3/14 3/18  3/21         SLR flex and ab X20 ea x20 X20 ea X20 ea         bridges 5\"x20 5\"x20 5\" x20 5\"x20         LAQ 5\"x20 5\"x20 5\"x20  5\"x20         Mini squats np x20  x20         Step ups fwd and lat np 6in x20 ea 6 in x20 ea  6 in x20 ea          Step up and over   4\" 10x 4\" 15x                      Ther Ex 3/12 3/14 3/18 3/21         Recumbent bike 10 min 10 min 5'/5\" holds rocking 10 min          Gastroc strap stretch 30\"X3 30\"x3 30\"x3 30\"x3         Hamstring strap stretch 30\"x3 30\"x3 30\"x3 30\"x3         ITB stretch 30\"x2 30\"x3 30\"x3 30\"x3         HR np                                                   Ther Activity                                       Gait Training             Stair negotiation   2x reciprocal R handrail only 5x reciprocal R handrail only                      Modalities         "                                   Eval/Re-Eval POC Expires Auth #/ Referral # Total Visits Start Date Expiration Date Extension Info Visits Limitation   1/10 4/10 [facility]/AARP              2/8 5/8                3/12 5/12                                                   1 2 3 4 5 6   1/10 2/8 F  2/16 2/20 2/22 2/27    7 8 9 10 11 12   2/29  3/5   3/7 3/12  F  3/14  3/18   13 14 15 16 17 18                 19 20 21 22 23 24                 25 26 27 28 29 30                          "beverage(s) daily.She exercises with enough effort to increase her heart rate 30 to 60 minutes per day.  She exercises with enough effort to increase her heart rate 5 days per week.   She is taking medications regularly.     ED/UC Followup:    Facility:  [facility]   Date of visit: 10/18/2023  Reason for visit: headache   Current Status: Same     Review of Systems   Constitutional, HEENT, cardiovascular, pulmonary, GI, , musculoskeletal, neuro, skin, endocrine and psych systems are negative, except as otherwise noted.      Objective    /78   Pulse 93   Temp 97.1  F (36.2  C) (Temporal)   Resp 20   Ht 5' 7.4\" (1.712 m)   Wt 209 lb (94.8 kg)   LMP  (LMP Unknown)   SpO2 98%   BMI 32.35 kg/m    Body mass index is 32.35 kg/m .  Physical Exam   GENERAL: healthy, alert and no distress  NECK: no adenopathy, no asymmetry, masses, or scars and thyroid normal to palpation  RESP: lungs clear to auscultation - no rales, rhonchi or wheezes  CV: regular rate and rhythm, normal S1 S2, no S3 or S4, no murmur, click or rub, no peripheral edema and peripheral pulses strong  ABDOMEN: soft, nontender, no hepatosplenomegaly, no masses and bowel sounds normal  MS: no gross musculoskeletal defects noted, no edema, though she does have some weakness of the right arm compared to the left upon exam today.  Suspect cervical involvement and possible radicular signs and symptoms.  No sensation loss has been noted.  SKIN: no suspicious lesions or rashes to exposed visible skin today.  NEURO: Normal strength and tone, mentation intact and speech normal  PSYCH: mentation appears normal, affect normal/bright    Reviewed MRI results from 2023.  Reviewed related labs as well.                "

## 2024-03-22 ENCOUNTER — OFFICE VISIT (OUTPATIENT)
Dept: OBGYN CLINIC | Facility: MEDICAL CENTER | Age: 74
End: 2024-03-22

## 2024-03-22 VITALS
HEART RATE: 67 BPM | SYSTOLIC BLOOD PRESSURE: 134 MMHG | HEIGHT: 59 IN | DIASTOLIC BLOOD PRESSURE: 84 MMHG | BODY MASS INDEX: 31.85 KG/M2 | WEIGHT: 158 LBS

## 2024-03-22 DIAGNOSIS — Z96.652 AFTERCARE FOLLOWING LEFT KNEE JOINT REPLACEMENT SURGERY: Primary | ICD-10-CM

## 2024-03-22 DIAGNOSIS — Z47.1 AFTERCARE FOLLOWING LEFT KNEE JOINT REPLACEMENT SURGERY: Primary | ICD-10-CM

## 2024-03-22 PROCEDURE — 99024 POSTOP FOLLOW-UP VISIT: CPT | Performed by: ORTHOPAEDIC SURGERY

## 2024-03-22 NOTE — PROGRESS NOTES
Assessment:   Diagnosis ICD-10-CM Associated Orders   1. Aftercare following left knee joint replacement surgery  Z47.1     Z96.652           Plan:  6 weeks status post left total knee arthroplasty and overall doing well.  Discussed applying skin cream ointment in line with her incision  Continue physical therapy to maximize recovery to focus on improving her flexion.  Ice and elevate as indicated.  Weightbearing and activity as tolerated.    To do next visit:  Return in about 6 weeks (around 5/3/2024) for re-check with x-rays upon arrival left knee.    The above stated was discussed in layman's terms and the patient expressed understanding.  All questions were answered to the patient's satisfaction.       Scribe Attestation      I,:  Ryan Paula am acting as a scribe while in the presence of the attending physician.:       I,:  Lisandro Diez MD personally performed the services described in this documentation    as scribed in my presence.:               Subjective:   Carina Bang is a 73 y.o. female who presents today for repeat evaluation 6 weeks status post left total knee arthroplasty.  Overall she is doing well.  She is attending physical therapy and progressing well.  She has stiffness getting up with her prolonged sedentary positions.  She is cautious and discomfort when ambulating stairs especially in an alternating fashion and has been taking them 1 at a time.  She denies any calf or thigh pain.  Denies any fevers or chills.      Review of systems negative unless otherwise specified in HPI  Review of Systems    Past Medical History:   Diagnosis Date    Asymptomatic menopausal state     Breast lump     Hyperlipemia     Pap smear abnormality of cervix with ASCUS favoring benign     Plantar fasciitis        Past Surgical History:   Procedure Laterality Date    CATARACT EXTRACTION Bilateral      SECTION      COLONOSCOPY      SD ARTHRP KNE CONDYLE&PLATU MEDIAL&LAT COMPARTMENTS Left 2024     Procedure: ARTHROPLASTY KNEE TOTAL W ROBOT;  Surgeon: Lisandro Diez MD;  Location: [facility];  Service: Orthopedics       Family History   Problem Relation Age of Onset    No Known Problems Mother     No Known Problems Father     No Known Problems Sister     No Known Problems Maternal Grandmother     No Known Problems Maternal Grandfather     No Known Problems Paternal Grandmother     No Known Problems Paternal Grandfather     No Known Problems Son     No Known Problems Son        Social History     Occupational History    Occupation: Retired   Tobacco Use    Smoking status: Never    Smokeless tobacco: Never   Vaping Use    Vaping status: Never Used   Substance and Sexual Activity    Alcohol use: Yes     Comment: remote social alcohol use    Drug use: No    Sexual activity: Yes     Partners: Male     Birth control/protection: Post-menopausal         Current Outpatient Medications:     atorvastatin (LIPITOR) 20 mg tablet, , Disp: , Rfl:     cholecalciferol (VITAMIN D3) 1,000 units tablet, Take 1 tablet by mouth daily, Disp: , Rfl:     gabapentin (NEURONTIN) 100 mg capsule, , Disp: , Rfl:     levothyroxine 25 mcg tablet, , Disp: , Rfl:     lisinopril (ZESTRIL) 10 mg tablet, , Disp: , Rfl:     metoprolol succinate (TOPROL-XL) 50 mg 24 hr tablet, , Disp: , Rfl:     acetaminophen (TYLENOL) 650 mg CR tablet, Take 1 tablet (650 mg total) by mouth every 8 (eight) hours as needed for mild pain (Patient not taking: Reported on 3/22/2024), Disp: 30 tablet, Rfl: 0    ascorbic acid (VITAMIN C) 500 mg tablet, TAKE 1 TABLET BY MOUTH TWICE A DAY (Patient not taking: Reported on 3/22/2024), Disp: 60 tablet, Rfl: 5    Calcium Ascorbate (VITAMIN C) 500 mg tablet, Take 1 tablet (500 mg total) by mouth 2 (two) times a day, Disp: 60 tablet, Rfl: 0    enoxaparin (LOVENOX) 40 mg/0.4 mL, Inject 0.4 mL (40 mg total) under the skin daily for 28 days To start post op, Disp: 11.2 mL, Rfl: 0    ferrous sulfate 324 (65 Fe) mg, Take 1  "tablet (324 mg total) by mouth daily before breakfast Start 30 days prior to surgery., Disp: 30 tablet, Rfl: 0    folic acid (FOLVITE) 1 mg tablet, Take 1 tablet (1 mg total) by mouth daily Start 30 days prior to surgery., Disp: 30 tablet, Rfl: 0    Allergies   Allergen Reactions    Penicillins Other (See Comments)     Childhood   slept a long time  3 yrs old??            Vitals:    03/22/24 1249   BP: 134/84   Pulse: 67       Body mass index is 31.91 kg/m².  Wt Readings from Last 3 Encounters:   03/22/24 71.7 kg (158 lb)   03/20/24 71.7 kg (158 lb)   02/23/24 71.7 kg (158 lb)       Objective:                    Left Knee Exam     Range of Motion   Left knee extension: Near full extension.   Left knee flexion: 95 degrees.     Other   Erythema: absent  Swelling: mild    Comments:    Intact extensor mechanism.  Healed anterior incision with 2 scabs 1 proximally 1 distally.  No drainage.  Appropriate mount of warmth.  No signs of infection.  Calf and thigh are soft and nontender no signs DVT.  Stable to varus and valgus stressing.             Diagnostics, reviewed and taken today if performed as documented:    None performed          Procedures, if performed today:    Procedures    None performed      Portions of the record may have been created with voice recognition software.  Occasional wrong word or \"sound a like\" substitutions may have occurred due to the inherent limitations of voice recognition software.  Read the chart carefully and recognize, using context, where substitutions have occurred.  "

## 2024-03-26 ENCOUNTER — APPOINTMENT (OUTPATIENT)
Dept: PHYSICAL THERAPY | Facility: MEDICAL CENTER | Age: 74
End: 2024-03-26
Payer: MEDICARE

## 2024-03-28 ENCOUNTER — OFFICE VISIT (OUTPATIENT)
Dept: PHYSICAL THERAPY | Facility: MEDICAL CENTER | Age: 74
End: 2024-03-28
Payer: MEDICARE

## 2024-03-28 DIAGNOSIS — M17.12 PRIMARY OSTEOARTHRITIS OF LEFT KNEE: Primary | ICD-10-CM

## 2024-03-28 PROCEDURE — 97140 MANUAL THERAPY 1/> REGIONS: CPT | Performed by: PHYSICAL THERAPIST

## 2024-03-28 PROCEDURE — 97110 THERAPEUTIC EXERCISES: CPT | Performed by: PHYSICAL THERAPIST

## 2024-03-28 PROCEDURE — 97112 NEUROMUSCULAR REEDUCATION: CPT | Performed by: PHYSICAL THERAPIST

## 2024-03-28 NOTE — PROGRESS NOTES
"Daily Note     Today's date: 3/28/2024  Patient name: Carina Bang  : 1950  MRN: [id number]  Referring provider: Lillian Starr,*  Dx:   Encounter Diagnosis     ICD-10-CM    1. Primary osteoarthritis of left knee  M17.12                        Subjective: Patient offers no new complaints.      Objective: See treatment diary below      Assessment: Tolerated treatment well.   Patient demonstrated fatigue post treatment and would benefit from continued PT to return to prior level of function.   Increased height for step downs w/ good tolerance.      Plan: Progress treatment as tolerated.       Precautions: none      Manuals 3/12 3/14 3/18 3/21 3/28        PROM L knee 10 MIN 10 min 10 min 10 min 10 min        Graston L distal ITB and prox peroneals 5 min 10 min Declined  STM  5 min                                    Neuro Re-Ed 3/12 3/14 3/18  3/21 3/28        SLR flex and ab X20 ea x20 X20 ea X20 ea X20 ea        bridges 5\"x20 5\"x20 5\" x20 5\"x20 5\"x20        LAQ 5\"x20 5\"x20 5\"x20  5\"x20 5\"x20        Mini squats np x20  x20 x20        Step ups fwd and lat np 6in x20 ea 6 in x20 ea  6 in x20 ea  6in x20 ea        Step up and over   4\" 10x 4\" 15x 6in x20                     Ther Ex 3/12 3/14 3/18 3/21 3/28        Recumbent bike 10 min 10 min 5'/5\" holds rocking 10 min  10 min        Gastroc strap stretch 30\"X3 30\"x3 30\"x3 30\"x3 30\"x3        Hamstring strap stretch 30\"x3 30\"x3 30\"x3 30\"x3 30\"x3        ITB stretch 30\"x2 30\"x3 30\"x3 30\"x3 30\"x330\"x3        HR np                                                   Ther Activity                                       Gait Training             Stair negotiation   2x reciprocal R handrail only 5x reciprocal R handrail only                      Modalities                                           Eval/Re-Eval POC Expires Auth #/ Referral # Total Visits Start Date Expiration Date Extension Info Visits Limitation   1/10 4/10 CHREI/GERBER                           "    3/12 5/12                                                   1 2 3 4 5 6   1/10 2/8 F  2/16  2/20  2/22 2/27    7 8 9 10 11 12   2/29  3/5   3/7 3/12  F  3/14  3/18   13 14 15 16 17 18    3/21 3/28           19 20 21 22 23 24                 25 26 27 28 29 30

## 2024-04-02 ENCOUNTER — OFFICE VISIT (OUTPATIENT)
Dept: PHYSICAL THERAPY | Facility: MEDICAL CENTER | Age: 74
End: 2024-04-02
Payer: MEDICARE

## 2024-04-02 DIAGNOSIS — M17.12 PRIMARY OSTEOARTHRITIS OF LEFT KNEE: Primary | ICD-10-CM

## 2024-04-02 PROCEDURE — 97140 MANUAL THERAPY 1/> REGIONS: CPT | Performed by: PHYSICAL THERAPIST

## 2024-04-02 PROCEDURE — 97112 NEUROMUSCULAR REEDUCATION: CPT | Performed by: PHYSICAL THERAPIST

## 2024-04-02 NOTE — PROGRESS NOTES
"Daily Note     Today's date: 2024  Patient name: Carina Bang  : 1950  MRN: [id number]  Referring provider: Lillian Starr,*  Dx:   Encounter Diagnosis     ICD-10-CM    1. Primary osteoarthritis of left knee  M17.12                        Subjective: Pt progressing well.  She was able to run up the stairs at home.      Objective: See treatment diary below      Assessment: Tolerated treatment well.   Patient demonstrated fatigue post treatment and would benefit from continued PT to return to prior level of function.   Increased step height for step ups w/ good tolerance.      Plan: Progress treatment as tolerated.       Precautions: none      Manuals 3/12 3/14 3/18 3/21 3/28 4/2       PROM L knee 10 MIN 10 min 10 min 10 min 10 min 10 min       Graston L distal ITB and prox peroneals 5 min 10 min Declined  STM  5 min                                    Neuro Re-Ed 3/12 3/14 3/18  3/21 3/28 4/2       SLR flex and ab X20 ea x20 X20 ea X20 ea X20 ea 20       bridges 5\"x20 5\"x20 5\" x20 5\"x20 5\"x20 5\"x20       LAQ 5\"x20 5\"x20 5\"x20  5\"x20 5\"x20 5\"x20       Mini squats np x20  x20 x20 x20       Step ups fwd and lat np 6in x20 ea 6 in x20 ea  6 in x20 ea  6in x20 ea 8in x20 ea       Step up and over   4\" 10x 4\" 15x 6in x20 6in x20                    Ther Ex 3/12 3/14 3/18 3/21 3/28 4/2       Recumbent bike 10 min 10 min 5'/5\" holds rocking 10 min  10 min 10 min       Gastroc strap stretch 30\"X3 30\"x3 30\"x3 30\"x3 30\"x3 30\"x3       Hamstring strap stretch 30\"x3 30\"x3 30\"x3 30\"x3 30\"x3 30\"x3       ITB stretch 30\"x2 30\"x3 30\"x3 30\"x3 30\"x3 30\"x3       HR np                                                   Ther Activity                                       Gait Training             Stair negotiation   2x reciprocal R handrail only 5x reciprocal R handrail only                      Modalities                                           Eval/Re-Eval POC Expires Auth #/ Referral # Total Visits Start Date " Expiration Date Extension Info Visits Limitation   1/10 4/10 [facility]/AARP              2/8 5/8                3/12 5/12                                                   1 2 3 4 5 6   1/10 2/8 F  2/16 2/20 2/22 2/27    7 8 9 10 11 12   2/29  3/5   3/7 3/12  F  3/14  3/18   13 14 15 16 17 18    3/21 3/28  4/2         19 20 21 22 23 24                 25 26 27 28 29 30

## 2024-04-04 ENCOUNTER — OFFICE VISIT (OUTPATIENT)
Dept: PHYSICAL THERAPY | Facility: MEDICAL CENTER | Age: 74
End: 2024-04-04
Payer: MEDICARE

## 2024-04-04 DIAGNOSIS — M17.12 PRIMARY OSTEOARTHRITIS OF LEFT KNEE: Primary | ICD-10-CM

## 2024-04-04 PROCEDURE — 97112 NEUROMUSCULAR REEDUCATION: CPT | Performed by: PHYSICAL THERAPIST

## 2024-04-04 PROCEDURE — 97140 MANUAL THERAPY 1/> REGIONS: CPT | Performed by: PHYSICAL THERAPIST

## 2024-04-04 NOTE — PROGRESS NOTES
"Daily Note     Today's date: 2024  Patient name: Carina Bang  : 1950  MRN: [id number]  Referring provider: Lillian Starr,*  Dx:   Encounter Diagnosis     ICD-10-CM    1. Primary osteoarthritis of left knee  M17.12                        Subjective: Pt reports stiffness yesterday for unknown reason.  Today she is feeling better.       Objective: See treatment diary below      Assessment: Tolerated treatment well.   Patient demonstrated fatigue post treatment and would benefit from continued PT to return to prior level of function. No complaints offered throughout treatment.        Plan: Progress treatment as tolerated.       Precautions: none      Manuals 3/12 3/14 3/18 3/21 3/28 4/2 4/4      PROM L knee 10 MIN 10 min 10 min 10 min 10 min 10 min 10 min      Graston L distal ITB and prox peroneals 5 min 10 min Declined  STM  5 min                                    Neuro Re-Ed 3/12 3/14 3/18  3/21 3/28 4/2 4/4      SLR flex and ab X20 ea x20 X20 ea X20 ea X20 ea 20 x20      bridges 5\"x20 5\"x20 5\" x20 5\"x20 5\"x20 5\"x20 5\"x20      LAQ 5\"x20 5\"x20 5\"x20  5\"x20 5\"x20 5\"x20 5\"x20      Mini squats np x20  x20 x20 x20 x20      Step ups fwd and lat np 6in x20 ea 6 in x20 ea  6 in x20 ea  6in x20 ea 8in x20 ea 8in x20 ea      Step up and over   4\" 10x 4\" 15x 6in x20 6in x20 6in x20                   Ther Ex 3/12 3/14 3/18 3/21 3/28 4/2 4/4      Recumbent bike 10 min 10 min 5'/5\" holds rocking 10 min  10 min 10 min 10 min      Gastroc strap stretch 30\"X3 30\"x3 30\"x3 30\"x3 30\"x3 30\"x3 30\"x3      Hamstring strap stretch 30\"x3 30\"x3 30\"x3 30\"x3 30\"x3 30\"x3 30\"x3      ITB stretch 30\"x2 30\"x3 30\"x3 30\"x3 30\"x3 30\"x3 30\"x3      HR np                                                   Ther Activity                                       Gait Training             Stair negotiation   2x reciprocal R handrail only 5x reciprocal R handrail only                      Modalities                                     "       Eval/Re-Eval POC Expires Auth #/ Referral # Total Visits Start Date Expiration Date Extension Info Visits Limitation   1/10 4/10 [facility]/AARP              2/8 5/8                3/12 5/12                                                   1 2 3 4 5 6   1/10 2/8 F  2/16 2/20 2/22 2/27    7 8 9 10 11 12   2/29  3/5   3/7 3/12  F  3/14  3/18   13 14 15 16 17 18    3/21 3/28  4/2  4/4       19 20 21 22 23 24                 25 26 27 28 29 30

## 2024-04-09 ENCOUNTER — OFFICE VISIT (OUTPATIENT)
Dept: PHYSICAL THERAPY | Facility: MEDICAL CENTER | Age: 74
End: 2024-04-09
Payer: MEDICARE

## 2024-04-09 DIAGNOSIS — M17.12 PRIMARY OSTEOARTHRITIS OF LEFT KNEE: Primary | ICD-10-CM

## 2024-04-09 PROCEDURE — 97112 NEUROMUSCULAR REEDUCATION: CPT | Performed by: PHYSICAL THERAPIST

## 2024-04-09 PROCEDURE — 97140 MANUAL THERAPY 1/> REGIONS: CPT | Performed by: PHYSICAL THERAPIST

## 2024-04-09 NOTE — PROGRESS NOTES
"Daily Note     Today's date: 2024  Patient name: Carina Bang  : 1950  MRN: [id number]  Referring provider: Lisandro Diez,*  Dx:   Encounter Diagnosis     ICD-10-CM    1. Primary osteoarthritis of left knee  M17.12                        Subjective: Pt reports continued improvement.  She reports some apprehension w/ stairs.      Objective: See treatment diary below      Assessment: Tolerated treatment well.   Patient demonstrated fatigue post treatment and would benefit from continued PT .  Increased step height for step downs w/ very good tolerance.        Plan: Progress treatment as tolerated.       Precautions: none      Manuals 3/12 3/14 3/18 3/21 3/28 4/2 4/4 4/9     PROM L knee 10 MIN 10 min 10 min 10 min 10 min 10 min 10 min 10 min     Graston L distal ITB and prox peroneals 5 min 10 min Declined  STM  5 min                                    Neuro Re-Ed 3/12 3/14 3/18  3/21 3/28 4/2 4/4 4/9     SLR flex and ab X20 ea x20 X20 ea X20 ea X20 ea 20 x20 X20 ea     bridges 5\"x20 5\"x20 5\" x20 5\"x20 5\"x20 5\"x20 5\"x20 5\"x20     LAQ 5\"x20 5\"x20 5\"x20  5\"x20 5\"x20 5\"x20 5\"x20 5\"x20     Mini squats np x20  x20 x20 x20 x20 x20     Step ups fwd and lat np 6in x20 ea 6 in x20 ea  6 in x20 ea  6in x20 ea 8in x20 ea 8in x20 ea 8in x20 ea     Step up and over   4\" 10x 4\" 15x 6in x20 6in x20 6in x20 8in x20                  Ther Ex 3/12 3/14 3/18 3/21 3/28 4/2 4/4 4/9     Recumbent bike 10 min 10 min 5'/5\" holds rocking 10 min  10 min 10 min 10 min 10 min     Gastroc strap stretch 30\"X3 30\"x3 30\"x3 30\"x3 30\"x3 30\"x3 30\"x3 30\"X3     Hamstring strap stretch 30\"x3 30\"x3 30\"x3 30\"x3 30\"x3 30\"x3 30\"x3 30\"x3     ITB stretch 30\"x2 30\"x3 30\"x3 30\"x3 30\"x3 30\"x3 30\"x3 30\"x3     HR np                                                   Ther Activity                                       Gait Training             Stair negotiation   2x reciprocal R handrail only 5x reciprocal R handrail only                    "   Modalities                                           Eval/Re-Eval POC Expires Auth #/ Referral # Total Visits Start Date Expiration Date Extension Info Visits Limitation   1/10 4/10 [facility]/AARP              2/8 5/8                3/12 5/12                                                   1 2 3 4 5 6   1/10 2/8 F  2/16 2/20 2/22 2/27    7 8 9 10 11 12   2/29  3/5   3/7 3/12  F  3/14  3/18   13 14 15 16 17 18    3/21 3/28  4/2  4/4 4/9      19 20 21 22 23 24                 25 26 27 28 29 30

## 2024-04-11 ENCOUNTER — EVALUATION (OUTPATIENT)
Dept: PHYSICAL THERAPY | Facility: MEDICAL CENTER | Age: 74
End: 2024-04-11
Payer: MEDICARE

## 2024-04-11 DIAGNOSIS — M17.12 PRIMARY OSTEOARTHRITIS OF LEFT KNEE: Primary | ICD-10-CM

## 2024-04-11 PROCEDURE — 97140 MANUAL THERAPY 1/> REGIONS: CPT | Performed by: PHYSICAL THERAPIST

## 2024-04-11 PROCEDURE — 97112 NEUROMUSCULAR REEDUCATION: CPT | Performed by: PHYSICAL THERAPIST

## 2024-04-11 NOTE — PROGRESS NOTES
PT Re-Evaluation     Today's date: 2024  Patient name: Carina Bang  : 1950  MRN: [id number]  Referring provider: Yohana Lux CRNP  Dx:   Encounter Diagnosis     ICD-10-CM    1. Primary osteoarthritis of left knee  M17.12                        Assessment  Assessment details: Carina Bang has attended 18 visits since initiating PT post-operatively and has demonstrated overall improvement.  Mobility and strength has improved with decreased reports of pain. Carina Bang has demonstrated an improvement in function as well.  Currently, she has made steady progress towards her goals, but continue to reports some sensitivity along medial and lateral knee and some stiffness w/ prolonged sitting.   At this time, continued physical therapy is recommended to continue 1x/week in order to address their remaining impairments in effort to further improve function.      Impairments: abnormal gait, abnormal muscle tone, abnormal or restricted ROM, abnormal movement, impaired physical strength, lacks appropriate home exercise program, pain with function and weight-bearing intolerance  Understanding of Dx/Px/POC: good   Prognosis: good    Goals  Short Term Goals:   1.  Pain decreased 2 subjective ratings in 4 weeks  MET  2.  Increased knee flexion 10* in 4 weeks MET  3.  Increased knee extension 5* in 4 weeks MET  4.  Strength improved 1/2 grade in 4 weeks MET    Long Term Goals:  1.  Ambulating without AD 8 weeks  MET  2.  Stair climbing with reciprocally in 8 weeks PARTIALLY MET  3.  Independent with HEP  MET    Plan  Patient would benefit from: skilled physical therapy  Planned modality interventions: cryotherapy  Planned therapy interventions: abdominal trunk stabilization, strengthening, stretching, therapeutic exercise, IASTM, manual therapy, neuromuscular re-education, patient education, home exercise program, functional ROM exercises and flexibility  Frequency: 1x week  Duration in weeks:  "8  Treatment plan discussed with: patient        Subjective Evaluation    History of Present Illness  Mechanism of injury: Pt continues to progress very well.  She does report some sensitivity w/ tighter pants on lateral and medial knee.   Some stiffness when standing/walking after about an hour of sitting.  She reports improved confidence w/ stair negotiation.  No sleep disturbance due to knee pain.  She has resumed all household chores and daily tasks.      Patient Goals  Patient goals for therapy: decreased pain, increased motion, increased strength and independence with ADLs/IADLs    Pain  At best pain ratin  At worst pain ratin          Objective     Palpation     Additional Palpation Details  \    Active Range of Motion   Left Knee   Flexion: 100 degrees   Extension: -6 degrees     Passive Range of Motion   Left Knee   Flexion: 106 degrees   Extension: -2 degrees     Strength/Myotome Testing     Left Hip   Planes of Motion   Flexion: 5  Abduction: 5    Left Knee   Flexion: 4+  Extension: 5      Flowsheet Rows      Flowsheet Row Most Recent Value   PT/OT G-Codes    Current Score 47   Projected Score 61   FOTO information reviewed Yes   Assessment Type Re-evaluation   G code set Other PT/OT Primary   Other PT Primary Current Status () CJ   Other PT Primary Goal Status () CJ                   Precautions: none      Manuals             PROM L knee 10 MIN            Graston L distal ITB and prox peroneals np                                      Neuro Re-Ed             SLR flex and ab X20 ea            bridges 5\"x20            LAQ 5\"x20            Mini squats X20            Step ups fwd and lat 8IN X20            Step downs 8in x20                         Ther Ex             Recumbent bike 10 min            Gastroc strap stretch 30\"X3            Hamstring strap stretch 30\"x3            ITB stretch 30\"x2                                                                Ther Activity         "                               Gait Training                                       Modalities                                          Eval/Re-Eval POC Expires Auth #/ Referral # Total Visits Start Date Expiration Date Extension Info Visits Limitation   1/10 4/10 [facility]/AARP              2/8 5/8                3/12 5/12                                                   1 2 3 4 5 6   1/10 2/8 F  2/16 2/20 2/22 2/27    7 8 9 10 11 12   2/29  3/5   3/7 3/12  F  3/14  3/18   13 14 15 16 17 18    3/21 3/28  4/2  4/4 4/9  4/11 F   19 20 21 22 23 24                 25 26 27 28 29 30

## 2024-04-17 ENCOUNTER — OFFICE VISIT (OUTPATIENT)
Dept: FAMILY MEDICINE CLINIC | Facility: MEDICAL CENTER | Age: 74
End: 2024-04-17
Payer: MEDICARE

## 2024-04-17 VITALS
RESPIRATION RATE: 18 BRPM | HEART RATE: 64 BPM | TEMPERATURE: 97.4 F | OXYGEN SATURATION: 97 % | SYSTOLIC BLOOD PRESSURE: 132 MMHG | HEIGHT: 59 IN | DIASTOLIC BLOOD PRESSURE: 82 MMHG | WEIGHT: 156 LBS | BODY MASS INDEX: 31.45 KG/M2

## 2024-04-17 DIAGNOSIS — Z23 ENCOUNTER FOR IMMUNIZATION: ICD-10-CM

## 2024-04-17 DIAGNOSIS — I10 ESSENTIAL HYPERTENSION: ICD-10-CM

## 2024-04-17 DIAGNOSIS — E78.00 HYPERCHOLESTEROLEMIA: ICD-10-CM

## 2024-04-17 DIAGNOSIS — E03.9 HYPOTHYROIDISM, UNSPECIFIED TYPE: ICD-10-CM

## 2024-04-17 DIAGNOSIS — Z00.00 MEDICARE ANNUAL WELLNESS VISIT, SUBSEQUENT: Primary | ICD-10-CM

## 2024-04-17 DIAGNOSIS — R73.9 HYPERGLYCEMIA: ICD-10-CM

## 2024-04-17 PROCEDURE — G0438 PPPS, INITIAL VISIT: HCPCS

## 2024-04-17 PROCEDURE — G0009 ADMIN PNEUMOCOCCAL VACCINE: HCPCS

## 2024-04-17 PROCEDURE — 90677 PCV20 VACCINE IM: CPT

## 2024-04-17 PROCEDURE — 99214 OFFICE O/P EST MOD 30 MIN: CPT

## 2024-04-17 NOTE — PROGRESS NOTES
Assessment and Plan:   Fasting lab orders placed, to be done earliest convenience.  PCV 20 vaccine updated today.  Patient will check with pharmacy on Shingrix vaccine.  Patient will have influenza vaccine and COVID booster at next visit in the fall.  Continue regular visits with cardiologist.  Return in 6 months for follow-up, sooner if needed.  Problem List Items Addressed This Visit     Hypothyroidism    Relevant Orders    TSH, 3rd generation with Free T4 reflex    Hypercholesterolemia    Relevant Orders    Lipid panel    Essential hypertension    Relevant Orders    Comprehensive metabolic panel   Other Visit Diagnoses     Medicare annual wellness visit, subsequent    -  Primary    Encounter for immunization        Relevant Orders    Pneumococcal Conjugate Vaccine 20-valent (Pcv20)    Hyperglycemia        Relevant Orders    Hemoglobin A1C            Depression Screening and Follow-up Plan: Patient was screened for depression during today's encounter. They screened negative with a PHQ-2 score of 0.      Preventive health issues were discussed with patient, and age appropriate screening tests were ordered as noted in patient's After Visit Summary.  Personalized health advice and appropriate referrals for health education or preventive services given if needed, as noted in patient's After Visit Summary.     History of Present Illness:     Patient presents for a Medicare Wellness Visit    73-year-old female presents for annual Medicare wellness visit.  She is here today with her  who she cares for. She is still driving, getting around without difficulty.  She recently had left knee replacement done in February by Dr. Diez, recovering well and is planning to attend last physical therapy session tomorrow.  She is doing well overall and offers no complaints at this time.  She continues to follow yearly with [facility] cardiology.  She is agreeable to fasting labs, no prior labs in chart from prior PCP   Eliud.  She is agreeable to PCV 20 vaccine today.  She will get Influenza and Covid booster during next visit in the fall.          Patient Care Team:  YA Garnica as PCP - General (Nurse Practitioner)     Review of Systems:     Review of Systems   Constitutional: Negative.    HENT: Negative.     Eyes: Negative.    Respiratory: Negative.     Cardiovascular: Negative.    Gastrointestinal: Negative.    Endocrine: Negative.    Genitourinary: Negative.    Musculoskeletal: Negative.    Skin: Negative.    Allergic/Immunologic: Negative.    Neurological: Negative.    Hematological: Negative.    Psychiatric/Behavioral: Negative.          Problem List:     Patient Active Problem List   Diagnosis   • Bullous myringitis of right ear   • Stress incontinence   • Obesity (BMI 30.0-34.9)   • Low bone density   • Hypothyroidism   • Hypercholesterolemia   • Family history of breast cancer in first degree relative   • Primary osteoarthritis of left knee   • Essential hypertension   • Encounter for geriatric assessment      Past Medical and Surgical History:     Past Medical History:   Diagnosis Date   • Asymptomatic menopausal state    • Breast lump    • Hyperlipemia    • Pap smear abnormality of cervix with ASCUS favoring benign    • Plantar fasciitis      Past Surgical History:   Procedure Laterality Date   • CATARACT EXTRACTION Bilateral    •  SECTION     • COLONOSCOPY     • TX ARTHRP KNE CONDYLE&PLATU MEDIAL&LAT COMPARTMENTS Left 2024    Procedure: ARTHROPLASTY KNEE TOTAL W ROBOT;  Surgeon: Lisandro Diez MD;  Location:  MAIN OR;  Service: Orthopedics      Family History:     Family History   Problem Relation Age of Onset   • No Known Problems Mother    • No Known Problems Father    • No Known Problems Sister    • No Known Problems Maternal Grandmother    • No Known Problems Maternal Grandfather    • No Known Problems Paternal Grandmother    • No Known Problems Paternal Grandfather    • No Known  Problems Son    • No Known Problems Son       Social History:     Social History     Socioeconomic History   • Marital status: /Civil Union     Spouse name: None   • Number of children: None   • Years of education: None   • Highest education level: None   Occupational History   • Occupation: Retired   Tobacco Use   • Smoking status: Never   • Smokeless tobacco: Never   Vaping Use   • Vaping status: Never Used   Substance and Sexual Activity   • Alcohol use: Yes     Comment: remote social alcohol use   • Drug use: No   • Sexual activity: Yes     Partners: Male     Birth control/protection: Post-menopausal   Other Topics Concern   • None   Social History Narrative     Normal Vaginal Delivery        Daily caffeine: 1 serving/day    Exercise habits         Social Determinants of Health     Financial Resource Strain: Not on file   Food Insecurity: No Food Insecurity (2024)    Hunger Vital Sign    • Worried About Running Out of Food in the Last Year: Never true    • Ran Out of Food in the Last Year: Never true   Transportation Needs: No Transportation Needs (2024)    PRAPARE - Transportation    • Lack of Transportation (Medical): No    • Lack of Transportation (Non-Medical): No   Physical Activity: Not on file   Stress: Not on file   Social Connections: Not on file   Intimate Partner Violence: Not on file   Housing Stability: Low Risk  (2024)    Housing Stability Vital Sign    • Unable to Pay for Housing in the Last Year: No    • Number of Places Lived in the Last Year: 1    • Unstable Housing in the Last Year: No      Medications and Allergies:     Current Outpatient Medications   Medication Sig Dispense Refill   • atorvastatin (LIPITOR) 20 mg tablet      • cholecalciferol (VITAMIN D3) 1,000 units tablet Take 1 tablet by mouth daily     • gabapentin (NEURONTIN) 100 mg capsule      • levothyroxine 25 mcg tablet      • lisinopril (ZESTRIL) 10 mg tablet      • metoprolol succinate (TOPROL-XL) 50 mg  24 hr tablet      • acetaminophen (TYLENOL) 650 mg CR tablet Take 1 tablet (650 mg total) by mouth every 8 (eight) hours as needed for mild pain (Patient not taking: Reported on 3/22/2024) 30 tablet 0   • Calcium Ascorbate (VITAMIN C) 500 mg tablet Take 1 tablet (500 mg total) by mouth 2 (two) times a day 60 tablet 0     No current facility-administered medications for this visit.     Allergies   Allergen Reactions   • Penicillins Other (See Comments)     Childhood   slept a long time  3 yrs old??      Immunizations:     Immunization History   Administered Date(s) Administered   • COVID-19 MODERNA VACC 0.5 ML IM 02/05/2021, 03/05/2021, 05/23/2022   • COVID-19 Moderna Vac BIVALENT 12 Yr+ IM 0.5 ML 11/08/2022      Health Maintenance:         Topic Date Due   • Hepatitis C Screening  Never done   • Colorectal Cancer Screening  07/14/2024   • Breast Cancer Screening: Mammogram  03/20/2025         Topic Date Due   • Pneumococcal Vaccine: 65+ Years (1 of 1 - PCV) Never done   • Influenza Vaccine (1) Never done   • COVID-19 Vaccine (5 - 2023-24 season) 09/01/2023      Medicare Screening Tests and Risk Assessments:     Carina is here for her Subsequent Wellness visit.     Health Risk Assessment:   Patient rates overall health as good. Patient feels that their physical health rating is slightly better. Patient is satisfied with their life. Eyesight was rated as same. Hearing was rated as same. Patient feels that their emotional and mental health rating is same. Patients states they are sometimes angry. Patient states they are sometimes unusually tired/fatigued. Pain experienced in the last 7 days has been some. Patient's pain rating has been 4/10. Patient states that she has experienced no weight loss or gain in last 6 months. Left knee pain occasionally recent knee replacement. Doing well.     Depression Screening:   PHQ-2 Score: 0      Fall Risk Screening:   In the past year, patient has experienced: no history of falling  in past year      Urinary Incontinence Screening:   Patient has not leaked urine accidently in the last six months.     Home Safety:  Patient has trouble with stairs inside or outside of their home. Patient has working smoke alarms and has working carbon monoxide detector. Home safety hazards include: none.     Nutrition:   Current diet is Regular and Limited junk food.     Medications:   Patient is currently taking over-the-counter supplements. OTC medications include: see medication list. Patient is able to manage medications.     Activities of Daily Living (ADLs)/Instrumental Activities of Daily Living (IADLs):   Walk and transfer into and out of bed and chair?: Yes  Dress and groom yourself?: Yes    Bathe or shower yourself?: Yes    Feed yourself? Yes  Do your laundry/housekeeping?: Yes  Manage your money, pay your bills and track your expenses?: Yes  Make your own meals?: Yes    Do your own shopping?: Yes    Previous Hospitalizations:   Any hospitalizations or ED visits within the last 12 months?: Yes    How many hospitalizations have you had in the last year?: 1-2    Hospitalization Comments: Knee replacement, left.     Advance Care Planning:   Living will: No    Durable POA for healthcare: No    Advanced directive: No      Comments: Needs to finish living will, plans to call  to complete this.   Blue packet provided.     Cognitive Screening:   Provider or family/friend/caregiver concerned regarding cognition?: No    PREVENTIVE SCREENINGS      Cardiovascular Screening:    General: Screening Not Indicated and History Lipid Disorder      Diabetes Screening:     General: Screening Current      Colorectal Cancer Screening:     General: Screening Current      Breast Cancer Screening:     General: Screening Current      Cervical Cancer Screening:    General: Screening Not Indicated      Abdominal Aortic Aneurysm (AAA) Screening:        General: Screening Not Indicated      Lung Cancer Screening:     General:  "Screening Not Indicated    Screening, Brief Intervention, and Referral to Treatment (SBIRT)    Screening  Typical number of drinks in a day: 0  Typical number of drinks in a week: 0  Interpretation: Low risk drinking behavior.    AUDIT-C Screenin) How often did you have a drink containing alcohol in the past year? monthly or less  2) How many drinks did you have on a typical day when you were drinking in the past year? 0  3) How often did you have 6 or more drinks on one occasion in the past year? never    AUDIT-C Score: 1  Interpretation: Score 0-2 (female): Negative screen for alcohol misuse    Single Item Drug Screening:  How often have you used an illegal drug (including marijuana) or a prescription medication for non-medical reasons in the past year? never    Single Item Drug Screen Score: 0  Interpretation: Negative screen for possible drug use disorder    Other Counseling Topics:   Car/seat belt/driving safety, skin self-exam, sunscreen and calcium and vitamin D intake and regular weightbearing exercise.     No results found.     Physical Exam:     /82 (BP Location: Left arm, Patient Position: Sitting, Cuff Size: Standard)   Pulse 64   Temp (!) 97.4 °F (36.3 °C) (Temporal)   Resp 18   Ht 4' 11\" (1.499 m)   Wt 70.8 kg (156 lb)   SpO2 97%   BMI 31.51 kg/m²     Physical Exam  Vitals and nursing note reviewed.   Constitutional:       General: She is not in acute distress.     Appearance: Normal appearance. She is not ill-appearing.   HENT:      Head: Normocephalic and atraumatic.   Cardiovascular:      Rate and Rhythm: Normal rate and regular rhythm.      Pulses: Normal pulses.      Heart sounds: Normal heart sounds.   Pulmonary:      Effort: Pulmonary effort is normal.      Breath sounds: Normal breath sounds.   Skin:     General: Skin is warm and dry.   Neurological:      General: No focal deficit present.      Mental Status: She is alert and oriented to person, place, and time. Mental status " is at baseline.   Psychiatric:         Mood and Affect: Mood normal.         Behavior: Behavior normal.         Thought Content: Thought content normal.          YA Garnica

## 2024-04-17 NOTE — PATIENT INSTRUCTIONS
Medicare Preventive Visit Patient Instructions  Thank you for completing your Welcome to Medicare Visit or Medicare Annual Wellness Visit today. Your next wellness visit will be due in one year (4/18/2025).  The screening/preventive services that you may require over the next 5-10 years are detailed below. Some tests may not apply to you based off risk factors and/or age. Screening tests ordered at today's visit but not completed yet may show as past due. Also, please note that scanned in results may not display below.  Preventive Screenings:  Service Recommendations Previous Testing/Comments   Colorectal Cancer Screening  * Colonoscopy    * Fecal Occult Blood Test (FOBT)/Fecal Immunochemical Test (FIT)  * Fecal DNA/Cologuard Test  * Flexible Sigmoidoscopy Age: 45-75 years old   Colonoscopy: every 10 years (may be performed more frequently if at higher risk)  OR  FOBT/FIT: every 1 year  OR  Cologuard: every 3 years  OR  Sigmoidoscopy: every 5 years  Screening may be recommended earlier than age 45 if at higher risk for colorectal cancer. Also, an individualized decision between you and your healthcare provider will decide whether screening between the ages of 76-85 would be appropriate. Colonoscopy: Not on file  FOBT/FIT: Not on file  Cologuard: 07/14/2021  Sigmoidoscopy: Not on file    Screening Current     Breast Cancer Screening Age: 40+ years old  Frequency: every 1-2 years  Not required if history of left and right mastectomy Mammogram: 03/20/2024    Screening Current   Cervical Cancer Screening Between the ages of 21-29, pap smear recommended once every 3 years.   Between the ages of 30-65, can perform pap smear with HPV co-testing every 5 years.   Recommendations may differ for women with a history of total hysterectomy, cervical cancer, or abnormal pap smears in past. Pap Smear: 10/26/2020    Screening Not Indicated   Hepatitis C Screening Once for adults born between 1945 and 1965  More frequently in  patients at high risk for Hepatitis C Hep C Antibody: Not on file        Diabetes Screening 1-2 times per year if you're at risk for diabetes or have pre-diabetes Fasting glucose: 105 mg/dL (1/29/2024)  A1C: 6.1 % (1/29/2024)  Screening Current   Cholesterol Screening Once every 5 years if you don't have a lipid disorder. May order more often based on risk factors. Lipid panel: Not on file    Screening Not Indicated  History Lipid Disorder     Other Preventive Screenings Covered by Medicare:  Abdominal Aortic Aneurysm (AAA) Screening: covered once if your at risk. You're considered to be at risk if you have a family history of AAA.  Lung Cancer Screening: covers low dose CT scan once per year if you meet all of the following conditions: (1) Age 55-77; (2) No signs or symptoms of lung cancer; (3) Current smoker or have quit smoking within the last 15 years; (4) You have a tobacco smoking history of at least 20 pack years (packs per day multiplied by number of years you smoked); (5) You get a written order from a healthcare provider.  Glaucoma Screening: covered annually if you're considered high risk: (1) You have diabetes OR (2) Family history of glaucoma OR (3)  aged 50 and older OR (4)  American aged 65 and older  Osteoporosis Screening: covered every 2 years if you meet one of the following conditions: (1) You're estrogen deficient and at risk for osteoporosis based off medical history and other findings; (2) Have a vertebral abnormality; (3) On glucocorticoid therapy for more than 3 months; (4) Have primary hyperparathyroidism; (5) On osteoporosis medications and need to assess response to drug therapy.   Last bone density test (DXA Scan): 07/22/2021.  HIV Screening: covered annually if you're between the age of 15-65. Also covered annually if you are younger than 15 and older than 65 with risk factors for HIV infection. For pregnant patients, it is covered up to 3 times per  pregnancy.    Immunizations:  Immunization Recommendations   Influenza Vaccine Annual influenza vaccination during flu season is recommended for all persons aged >= 6 months who do not have contraindications   Pneumococcal Vaccine   * Pneumococcal conjugate vaccine = PCV13 (Prevnar 13), PCV15 (Vaxneuvance), PCV20 (Prevnar 20)  * Pneumococcal polysaccharide vaccine = PPSV23 (Pneumovax) Adults 19-63 yo with certain risk factors or if 65+ yo  If never received any pneumonia vaccine: recommend Prevnar 20 (PCV20)  Give PCV20 if previously received 1 dose of PCV13 or PPSV23   Hepatitis B Vaccine 3 dose series if at intermediate or high risk (ex: diabetes, end stage renal disease, liver disease)   Respiratory syncytial virus (RSV) Vaccine - COVERED BY MEDICARE PART D  * RSVPreF3 (Arexvy) CDC recommends that adults 60 years of age and older may receive a single dose of RSV vaccine using shared clinical decision-making (SCDM)   Tetanus (Td) Vaccine - COST NOT COVERED BY MEDICARE PART B Following completion of primary series, a booster dose should be given every 10 years to maintain immunity against tetanus. Td may also be given as tetanus wound prophylaxis.   Tdap Vaccine - COST NOT COVERED BY MEDICARE PART B Recommended at least once for all adults. For pregnant patients, recommended with each pregnancy.   Shingles Vaccine (Shingrix) - COST NOT COVERED BY MEDICARE PART B  2 shot series recommended in those 19 years and older who have or will have weakened immune systems or those 50 years and older     Health Maintenance Due:      Topic Date Due   • Hepatitis C Screening  Never done   • Colorectal Cancer Screening  07/14/2024   • Breast Cancer Screening: Mammogram  03/20/2025     Immunizations Due:      Topic Date Due   • Pneumococcal Vaccine: 65+ Years (1 of 1 - PCV) Never done   • Influenza Vaccine (1) Never done   • COVID-19 Vaccine (5 - 2023-24 season) 09/01/2023     Advance Directives   What are advance directives?   Advance directives are legal documents that state your wishes and plans for medical care. These plans are made ahead of time in case you lose your ability to make decisions for yourself. Advance directives can apply to any medical decision, such as the treatments you want, and if you want to donate organs.   What are the types of advance directives?  There are many types of advance directives, and each state has rules about how to use them. You may choose a combination of any of the following:  Living will:  This is a written record of the treatment you want. You can also choose which treatments you do not want, which to limit, and which to stop at a certain time. This includes surgery, medicine, IV fluid, and tube feedings.   Durable power of  for healthcare (DPAHC):  This is a written record that states who you want to make healthcare choices for you when you are unable to make them for yourself. This person, called a proxy, is usually a family member or a friend. You may choose more than 1 proxy.  Do not resuscitate (DNR) order:  A DNR order is used in case your heart stops beating or you stop breathing. It is a request not to have certain forms of treatment, such as CPR. A DNR order may be included in other types of advance directives.  Medical directive:  This covers the care that you want if you are in a coma, near death, or unable to make decisions for yourself. You can list the treatments you want for each condition. Treatment may include pain medicine, surgery, blood transfusions, dialysis, IV or tube feedings, and a ventilator (breathing machine).  Values history:  This document has questions about your views, beliefs, and how you feel and think about life. This information can help others choose the care that you would choose.  Why are advance directives important?  An advance directive helps you control your care. Although spoken wishes may be used, it is better to have your wishes written down.  Spoken wishes can be misunderstood, or not followed. Treatments may be given even if you do not want them. An advance directive may make it easier for your family to make difficult choices about your care.   Weight Management   Why it is important to manage your weight:  Being overweight increases your risk of health conditions such as heart disease, high blood pressure, type 2 diabetes, and certain types of cancer. It can also increase your risk for osteoarthritis, sleep apnea, and other respiratory problems. Aim for a slow, steady weight loss. Even a small amount of weight loss can lower your risk of health problems.  How to lose weight safely:  A safe and healthy way to lose weight is to eat fewer calories and get regular exercise. You can lose up about 1 pound a week by decreasing the number of calories you eat by 500 calories each day.   Healthy meal plan for weight management:  A healthy meal plan includes a variety of foods, contains fewer calories, and helps you stay healthy. A healthy meal plan includes the following:  Eat whole-grain foods more often.  A healthy meal plan should contain fiber. Fiber is the part of grains, fruits, and vegetables that is not broken down by your body. Whole-grain foods are healthy and provide extra fiber in your diet. Some examples of whole-grain foods are whole-wheat breads and pastas, oatmeal, brown rice, and bulgur.  Eat a variety of vegetables every day.  Include dark, leafy greens such as spinach, kale, ezra greens, and mustard greens. Eat yellow and orange vegetables such as carrots, sweet potatoes, and winter squash.   Eat a variety of fruits every day.  Choose fresh or canned fruit (canned in its own juice or light syrup) instead of juice. Fruit juice has very little or no fiber.  Eat low-fat dairy foods.  Drink fat-free (skim) milk or 1% milk. Eat fat-free yogurt and low-fat cottage cheese. Try low-fat cheeses such as mozzarella and other reduced-fat  cheeses.  Choose meat and other protein foods that are low in fat.  Choose beans or other legumes such as split peas or lentils. Choose fish, skinless poultry (chicken or turkey), or lean cuts of red meat (beef or pork). Before you cook meat or poultry, cut off any visible fat.   Use less fat and oil.  Try baking foods instead of frying them. Add less fat, such as margarine, sour cream, regular salad dressing and mayonnaise to foods. Eat fewer high-fat foods. Some examples of high-fat foods include french fries, doughnuts, ice cream, and cakes.  Eat fewer sweets.  Limit foods and drinks that are high in sugar. This includes candy, cookies, regular soda, and sweetened drinks.  Exercise:  Exercise at least 30 minutes per day on most days of the week. Some examples of exercise include walking, biking, dancing, and swimming. You can also fit in more physical activity by taking the stairs instead of the elevator or parking farther away from stores. Ask your healthcare provider about the best exercise plan for you.      © Copyright [employer] 2018 Information is for End User's use only and may not be sold, redistributed or otherwise used for commercial purposes. All illustrations and images included in CareNotes® are the copyrighted property of [employer].A[employer]M., Inc. or [employer]

## 2024-04-19 NOTE — ASSESSMENT & PLAN NOTE
Failed outpatient conservative measures  Elected to undergo total joint arthroplasty     Vancomycin trough = 15.1 (desired 10-15)    We will re-dose at end of next dialysis session.  Cultures still pending.

## 2024-04-25 ENCOUNTER — OFFICE VISIT (OUTPATIENT)
Dept: PHYSICAL THERAPY | Facility: MEDICAL CENTER | Age: 74
End: 2024-04-25
Payer: MEDICARE

## 2024-04-25 DIAGNOSIS — M17.12 PRIMARY OSTEOARTHRITIS OF LEFT KNEE: Primary | ICD-10-CM

## 2024-04-25 PROCEDURE — 97112 NEUROMUSCULAR REEDUCATION: CPT | Performed by: PHYSICAL THERAPIST

## 2024-04-25 PROCEDURE — 97110 THERAPEUTIC EXERCISES: CPT | Performed by: PHYSICAL THERAPIST

## 2024-04-25 PROCEDURE — 97140 MANUAL THERAPY 1/> REGIONS: CPT | Performed by: PHYSICAL THERAPIST

## 2024-04-25 NOTE — PROGRESS NOTES
"Daily Note     Today's date: 2024  Patient name: Carina Bang  : 1950  MRN: [id number]  Referring provider: Yohana Lux CRNP  Dx:   Encounter Diagnosis     ICD-10-CM    1. Primary osteoarthritis of left knee  M17.12                      Subjective: Pt reports continued improvement.  Negotiating stairs w/ less difficulty.      Objective: See treatment diary below      Assessment: Tolerated treatment well. Patient demonstrated fatigue post treatment, exhibited good technique with therapeutic exercises, and would benefit from continued PT      Plan: Continue per plan of care.      Precautions: none      Manuals            PROM L knee 10 MIN 10 min           Graston L distal ITB and prox peroneals np                                      Neuro Re-Ed            SLR flex and ab X20 ea 20 ea           bridges 5\"x20 5\"x20           LAQ 5\"x20 5\"x20           Mini squats X20 x20           Step ups fwd and lat 8IN X20 8in x20           Step downs 8in x20 8in x20                        Ther Ex            Recumbent bike 10 min 10 min           Gastroc strap stretch 30\"X3 30\"x3           Hamstring strap stretch 30\"x3 30\"x3           ITB stretch 30\"x2 30\"x3                                                               Ther Activity                                       Gait Training                                       Modalities                                            Eval/Re-Eval POC Expires Auth #/ Referral # Total Visits Start Date Expiration Date Extension Info Visits Limitation   1/10 4/10 [facility]/AARP              2/8 5/8                3/12 5/12                                                   1 2 3 4 5 6   1/10 2/8 F      7 8 9 10 11 12     3/5   3/7 3/12  F  3/14  3/18   13 14 15 16 17 18    3/21 3/28  4/ F   19 20 21 22 23 24                 25 26 27 28 29 30                   "

## 2024-05-02 ENCOUNTER — OFFICE VISIT (OUTPATIENT)
Dept: PHYSICAL THERAPY | Facility: MEDICAL CENTER | Age: 74
End: 2024-05-02
Payer: MEDICARE

## 2024-05-02 DIAGNOSIS — M17.12 PRIMARY OSTEOARTHRITIS OF LEFT KNEE: Primary | ICD-10-CM

## 2024-05-02 PROCEDURE — 97112 NEUROMUSCULAR REEDUCATION: CPT | Performed by: PHYSICAL THERAPIST

## 2024-05-02 PROCEDURE — 97140 MANUAL THERAPY 1/> REGIONS: CPT | Performed by: PHYSICAL THERAPIST

## 2024-05-02 PROCEDURE — 97110 THERAPEUTIC EXERCISES: CPT | Performed by: PHYSICAL THERAPIST

## 2024-05-02 NOTE — PROGRESS NOTES
"Daily Note     Today's date: 2024  Patient name: Carina Bang  : 1950  MRN: [id number]  Referring provider: Yohana Lux CRNP  Dx:   Encounter Diagnosis     ICD-10-CM    1. Primary osteoarthritis of left knee  M17.12                      Subjective: Pt progressing very well.  Has resumed all activity.      Objective: See treatment diary below      Assessment: Tolerated treatment well. Patient demonstrated fatigue post treatment, exhibited good technique with therapeutic exercises, and would benefit from continued PT  No complaints t/o treatment.  Ortho f/u tomorrow.    Plan: Continue per plan of care.      Precautions: none      Manuals           PROM L knee 10 MIN 10 min 10 min          Graston L distal ITB and prox peroneals np                                      Neuro Re-Ed           SLR flex and ab X20 ea 20 ea X20 ea          bridges 5\"x20 5\"x20 5\"x20          LAQ 5\"x20 5\"x20 5\"x20          Mini squats X20 x20 x20          Step ups fwd and lat 8IN X20 8in x20 8in x20          Step downs 8in x20 8in x20 8in x20                       Ther Ex           Recumbent bike 10 min 10 min 10 min          Gastroc strap stretch 30\"X3 30\"x3 30\"x3          Hamstring strap stretch 30\"x3 30\"x3 30\"x3          ITB stretch 30\"x2 30\"x3 30\"X3                                                              Ther Activity                                       Gait Training                                       Modalities                                            Eval/Re-Eval POC Expires Auth #/ Referral # Total Visits Start Date Expiration Date Extension Info Visits Limitation   1/10 4/10 [facility]              2/8 5/8                3/12 5/12                                                   1 2 3 4 5 6   1/10 2/8 F      7 8 9 10 11 12   2/29  3/5   3/7 3/12  F  3/14  3/18   13 14 15 16 17 18    3/21 3/28  4/  4/ F   19 20 21 22 23 24      " 5/2           25 26 27 28 29 30

## 2024-05-03 ENCOUNTER — OFFICE VISIT (OUTPATIENT)
Dept: OBGYN CLINIC | Facility: MEDICAL CENTER | Age: 74
End: 2024-05-03

## 2024-05-03 ENCOUNTER — APPOINTMENT (OUTPATIENT)
Dept: RADIOLOGY | Facility: MEDICAL CENTER | Age: 74
End: 2024-05-03
Payer: MEDICARE

## 2024-05-03 VITALS — WEIGHT: 156 LBS | HEIGHT: 59 IN | BODY MASS INDEX: 31.45 KG/M2

## 2024-05-03 DIAGNOSIS — Z96.652 STATUS POST TOTAL LEFT KNEE REPLACEMENT: ICD-10-CM

## 2024-05-03 DIAGNOSIS — Z96.652 AFTERCARE FOLLOWING LEFT KNEE JOINT REPLACEMENT SURGERY: ICD-10-CM

## 2024-05-03 DIAGNOSIS — Z47.1 AFTERCARE FOLLOWING LEFT KNEE JOINT REPLACEMENT SURGERY: ICD-10-CM

## 2024-05-03 DIAGNOSIS — Z96.652 STATUS POST TOTAL LEFT KNEE REPLACEMENT: Primary | ICD-10-CM

## 2024-05-03 PROCEDURE — 99024 POSTOP FOLLOW-UP VISIT: CPT

## 2024-05-03 PROCEDURE — 73560 X-RAY EXAM OF KNEE 1 OR 2: CPT

## 2024-05-03 RX ORDER — CEPHALEXIN 500 MG/1
CAPSULE ORAL
Qty: 20 CAPSULE | Refills: 1 | Status: SHIPPED | OUTPATIENT
Start: 2024-05-03 | End: 2026-03-01

## 2024-05-03 NOTE — PROGRESS NOTES
Assessment:   Diagnosis ICD-10-CM Associated Orders   1. Status post total left knee replacement  Z96.652 XR knee 1 or 2 vw left      2. Aftercare following left knee joint replacement surgery  Z47.1     Z96.652           Plan:  X-rays taken and reviewed, physical exam performed.  Doing very well 3 months status post left total knee arthroplasty.  Her sensitivity medially not over the area of the Pez anserine bursa may be of resultant of inflammation throughout her day-to-day activities causing symptoms at night.  Recommend liberal use of topical Voltaren gel and ice to the medial aspect of her left knee, mainly at night.  Total knee precautions with antibiotics as discussed for the next almost 2 years and a prescription of Keflex will be sent to her pharmacy.  She was offered a total joint recipient identification card however declined in the office today.  Weightbearing activity as tolerated.  Educated the importance of ongoing stretching and strengthening exercises for routine maintenance    To do next visit:  Return in about 3 months (around 8/3/2024) for re-check with x-rays upon arrival left knee.    The above stated was discussed in layman's terms and the patient expressed understanding.  All questions were answered to the patient's satisfaction.       ----------------------------------------------------------------------------------------------------------------        Subjective:   Carina Bang is a 73 y.o. female who presents today with her  3 months status post left total knee arthroplasty.  She presents today doing very well and is pleased she had her knee replacement surgery.  She presents without use of ambulatory assist device.  Her left knee does not warrant any oral medications.  She notes occasional achiness medially only at night but during the day it does not bother her.  She does have Voltaren gel at home but forgets to apply it.  Denies any calf or thigh pain.  Denies any fevers or  chills.      Review of systems negative unless otherwise specified in HPI  Review of Systems    Past Medical History:   Diagnosis Date    Asymptomatic menopausal state     Breast lump     Hyperlipemia     Pap smear abnormality of cervix with ASCUS favoring benign     Plantar fasciitis        Past Surgical History:   Procedure Laterality Date    CATARACT EXTRACTION Bilateral      SECTION      COLONOSCOPY      [place] ARTHRP KNE CONDYLE&PLATU MEDIAL&LAT COMPARTMENTS Left 2024    Procedure: ARTHROPLASTY KNEE TOTAL W ROBOT;  Surgeon: Lisandro Diez MD;  Location: [facility];  Service: Orthopedics       Family History   Problem Relation Age of Onset    No Known Problems Mother     No Known Problems Father     No Known Problems Sister     No Known Problems Maternal Grandmother     No Known Problems Maternal Grandfather     No Known Problems Paternal Grandmother     No Known Problems Paternal Grandfather     No Known Problems Son     No Known Problems Son        Social History     Occupational History    Occupation: Retired   Tobacco Use    Smoking status: Never    Smokeless tobacco: Never   Vaping Use    Vaping status: Never Used   Substance and Sexual Activity    Alcohol use: Yes     Comment: remote social alcohol use    Drug use: No    Sexual activity: Yes     Partners: Male     Birth control/protection: Post-menopausal         Current Outpatient Medications:     atorvastatin (LIPITOR) 20 mg tablet, , Disp: , Rfl:     cholecalciferol (VITAMIN D3) 1,000 units tablet, Take 1 tablet by mouth daily, Disp: , Rfl:     gabapentin (NEURONTIN) 100 mg capsule, , Disp: , Rfl:     levothyroxine 25 mcg tablet, , Disp: , Rfl:     lisinopril (ZESTRIL) 10 mg tablet, , Disp: , Rfl:     metoprolol succinate (TOPROL-XL) 50 mg 24 hr tablet, , Disp: , Rfl:     acetaminophen (TYLENOL) 650 mg CR tablet, Take 1 tablet (650 mg total) by mouth every 8 (eight) hours as needed for mild pain (Patient not taking: Reported on  "3/22/2024), Disp: 30 tablet, Rfl: 0    Calcium Ascorbate (VITAMIN C) 500 mg tablet, Take 1 tablet (500 mg total) by mouth 2 (two) times a day, Disp: 60 tablet, Rfl: 0    Allergies   Allergen Reactions    Penicillins Other (See Comments)     Childhood   slept a long time  3 yrs old??          There were no vitals filed for this visit.    Body mass index is 31.51 kg/m².  Wt Readings from Last 3 Encounters:   05/03/24 70.8 kg (156 lb)   04/17/24 70.8 kg (156 lb)   03/22/24 71.7 kg (158 lb)       Objective:                    Left Knee Exam     Muscle Strength   The patient has normal left knee strength.    Tenderness   The patient is experiencing no tenderness.     Range of Motion   Extension:  0   Flexion:  120 (Patella tracks well)     Other   Erythema: absent  Sensation: normal  Swelling: mild  Effusion: no effusion present    Comments:    Improved alignment to near neutral  Intact extensor mechanism without lag  Healed anterior incision, very minimal warmth.  No signs of infection.  Stable to varus and valgus stressing in extension and mid flexion.  Calf and thigh are soft and nontender no signs DVT            Diagnostics, reviewed and taken today if performed as documented:    The attending physician has personally reviewed the pertinent films in PACS and interpretation is as follows:    Left knee x-rays taken and reviewed in the office today and show: Well aligned, position, bonded total knee prosthesis without signs of loosening or stress shielding.      Procedures, if performed today:    None performed      Portions of the record may have been created with voice recognition software.  Occasional wrong word or \"sound a like\" substitutions may have occurred due to the inherent limitations of voice recognition software.  Read the chart carefully and recognize, using context, where substitutions have occurred.  "

## 2024-05-09 ENCOUNTER — OFFICE VISIT (OUTPATIENT)
Dept: PHYSICAL THERAPY | Facility: MEDICAL CENTER | Age: 74
End: 2024-05-09
Payer: MEDICARE

## 2024-05-09 DIAGNOSIS — M17.12 PRIMARY OSTEOARTHRITIS OF LEFT KNEE: Primary | ICD-10-CM

## 2024-05-09 PROCEDURE — 97112 NEUROMUSCULAR REEDUCATION: CPT | Performed by: PHYSICAL THERAPIST

## 2024-05-09 PROCEDURE — 97140 MANUAL THERAPY 1/> REGIONS: CPT | Performed by: PHYSICAL THERAPIST

## 2024-05-09 NOTE — PROGRESS NOTES
"Daily Note     Today's date: 2024  Patient name: Carina Bang  : 1950  MRN: [id number]  Referring provider: Yohana Lux CRNP  Dx:   Encounter Diagnosis     ICD-10-CM    1. Primary osteoarthritis of left knee  M17.12                      Subjective: Pt has progressed very well.   Has resumed all activity.      Objective: See treatment diary below      Assessment: Tolerated treatment well. Patient demonstrated fatigue post treatment and exhibited good technique with therapeutic exercises      Plan:  D/C today     Precautions: none      Manuals          PROM L knee 10 MIN 10 min 10 min 10 min         Graston L distal ITB and prox peroneals np                                      Neuro Re-Ed          SLR flex and ab X20 ea 20 ea X20 ea X20 ea         bridges 5\"x20 5\"x20 5\"x20 5\"x20         LAQ 5\"x20 5\"x20 5\"x20 5\"x20         Mini squats X20 x20 x20 x20         Step ups fwd and lat 8IN X20 8in x20 8in x20 8in x20         Step downs 8in x20 8in x20 8in x20 8in x20                      Ther Ex          Recumbent bike 10 min 10 min 10 min 10 min         Gastroc strap stretch 30\"X3 30\"x3 30\"x3 30\"X3         Hamstring strap stretch 30\"x3 30\"x3 30\"x3 30\"X3         ITB stretch 30\"x2 30\"x3 30\"X3 30\"X3                                                             Ther Activity                                       Gait Training                                       Modalities                                            Eval/Re-Eval POC Expires Auth #/ Referral # Total Visits Start Date Expiration Date Extension Info Visits Limitation   1/10 4/10 [facility]/AARP              2/8 5/8                3/12 5/12                                                   1 2 3 4 5 6   1/10 2/8 F      7 8 9 10 11 12   2/29  3/5   3/7 3/12  F  3/14  3/18   13 14 15 16 17 18    3/21 3/28  4/2  4/4 4/9  4/11 F   19 20 21 22 23 24             25 26 27 28 " 29 30

## 2024-06-12 LAB
ALBUMIN SERPL-MCNC: 3.5 G/DL (ref 3.6–5.1)
ALBUMIN/GLOB SERPL: 1.5 (CALC) (ref 1–2.5)
ALP SERPL-CCNC: 117 U/L (ref 37–153)
ALT SERPL-CCNC: 20 U/L (ref 6–29)
AST SERPL-CCNC: 21 U/L (ref 10–35)
BILIRUB SERPL-MCNC: 0.3 MG/DL (ref 0.2–1.2)
BUN SERPL-MCNC: 14 MG/DL (ref 7–25)
BUN/CREAT SERPL: ABNORMAL (CALC) (ref 6–22)
CALCIUM SERPL-MCNC: 8.9 MG/DL (ref 8.6–10.4)
CHLORIDE SERPL-SCNC: 106 MMOL/L (ref 98–110)
CHOLEST SERPL-MCNC: 185 MG/DL
CHOLEST/HDLC SERPL: 3 (CALC)
CO2 SERPL-SCNC: 28 MMOL/L (ref 20–32)
CREAT SERPL-MCNC: 0.7 MG/DL (ref 0.6–1)
GFR/BSA.PRED SERPLBLD CYS-BASED-ARV: 91 ML/MIN/1.73M2
GLOBULIN SER CALC-MCNC: 2.4 G/DL (CALC) (ref 1.9–3.7)
GLUCOSE SERPL-MCNC: 98 MG/DL (ref 65–99)
HBA1C MFR BLD: 6 % OF TOTAL HGB
HDLC SERPL-MCNC: 62 MG/DL
LDLC SERPL CALC-MCNC: 103 MG/DL (CALC)
NONHDLC SERPL-MCNC: 123 MG/DL (CALC)
POTASSIUM SERPL-SCNC: 4.5 MMOL/L (ref 3.5–5.3)
PROT SERPL-MCNC: 5.9 G/DL (ref 6.1–8.1)
SODIUM SERPL-SCNC: 142 MMOL/L (ref 135–146)
TRIGL SERPL-MCNC: 108 MG/DL
TSH SERPL-ACNC: 2.64 MIU/L (ref 0.4–4.5)

## 2024-08-09 ENCOUNTER — OFFICE VISIT (OUTPATIENT)
Dept: OBGYN CLINIC | Facility: MEDICAL CENTER | Age: 74
End: 2024-08-09
Payer: MEDICARE

## 2024-08-09 ENCOUNTER — APPOINTMENT (OUTPATIENT)
Dept: RADIOLOGY | Facility: MEDICAL CENTER | Age: 74
End: 2024-08-09
Payer: MEDICARE

## 2024-08-09 VITALS — SYSTOLIC BLOOD PRESSURE: 124 MMHG | HEART RATE: 69 BPM | DIASTOLIC BLOOD PRESSURE: 65 MMHG

## 2024-08-09 DIAGNOSIS — Z96.652 STATUS POST TOTAL LEFT KNEE REPLACEMENT: ICD-10-CM

## 2024-08-09 DIAGNOSIS — M24.811 INTERNAL DERANGEMENT OF RIGHT SHOULDER: ICD-10-CM

## 2024-08-09 DIAGNOSIS — Z96.652 STATUS POST TOTAL LEFT KNEE REPLACEMENT: Primary | ICD-10-CM

## 2024-08-09 PROCEDURE — 99213 OFFICE O/P EST LOW 20 MIN: CPT | Performed by: ORTHOPAEDIC SURGERY

## 2024-08-09 PROCEDURE — 73560 X-RAY EXAM OF KNEE 1 OR 2: CPT

## 2024-08-09 NOTE — PROGRESS NOTES
Assessment/Plan:  1. Status post total left knee replacement  XR knee 1 or 2 vw left      2. Internal derangement of right shoulder          Scribe Attestation      I,:  Haider Rudolph am acting as a scribe while in the presence of the attending physician.:       I,:  Lisandro Diez MD personally performed the services described in this documentation    as scribed in my presence.:           Carina upon examination and review of the x-rays of the left knee is doing very well and presents well on clinical exam.  She demonstrates excellent range of motion and stability in all planes.  X-rays also demonstrate stable appearing total knee arthroplasty without implant failure or loosening observed.  Further delineation of care is below.    Utilize prophylactic antibiotics prior to any dental procedures over the next 1.5 years  Continue with activities of daily living  without specific restrictions  Utilize oral analgesics as needed  Follow-up 6 months repeat clinical evaluation and repeat x-ray of the left knee upon arrival    With regards to her right shoulder she demonstrates functional range of motion and strength in all planes.  It is likely that she is experiencing pain associated with rotator cuff tendinitis.  However there is no imaging to review today to rule out osteoarthritis.  Given her high level of function no interventions are recommended at this time.    Subjective: Postop evaluation left knee    Patient ID: Carina Bang is a very pleasant 73 y.o. female presenting for postop evaluation of her left knee.  She is 6 months status post left robotically assisted total knee arthroplasty.  She is happy to report that she is doing quite well and is not experiencing any pain.  She is able to ambulate without an assistive device.  She states that the medial pain that she was experiencing at her last visit 3 months ago has resolved.  She has continued to do her strengthening and stretching exercises which she  does believe has contributed to her symptomatic improvement.  She does acknowledge dermal sensation loss along the lateral aspect of her knee but denies pain.  Overall she is quite happy with her progress up to this point and has been able to return to most activities of daily living without restriction.  She states that she does try to avoid kneeling directly onto the knee.  However denies any specific pain attributed to this.    She does also have complaints of right shoulder pain that has been ongoing over the past 2 weeks.  She denies an inciting factor has contributing to her painful symptoms.  She describes it as an intermittent mild to moderate aching most notably at night.  However, throughout the day she does not experience significant symptoms.  Her range of motion is overall intact and does not appreciate significant weakness.  She denies any distal paresthesias of the right upper extremity.    Review of Systems   Constitutional:  Negative for chills, fever and unexpected weight change.   HENT:  Negative for hearing loss, nosebleeds and sore throat.    Eyes:  Negative for pain, redness and visual disturbance.   Respiratory:  Negative for cough, shortness of breath and wheezing.    Cardiovascular:  Negative for chest pain, palpitations and leg swelling.   Gastrointestinal:  Negative for abdominal pain, nausea and vomiting.   Endocrine: Negative for polydipsia and polyuria.   Genitourinary:  Negative for dysuria and hematuria.   Musculoskeletal:  Negative for arthralgias and myalgias.        See HPI   Skin:  Negative for rash and wound.   Neurological:  Negative for dizziness, numbness and headaches.   Psychiatric/Behavioral:  Negative for decreased concentration and suicidal ideas. The patient is not nervous/anxious.          Past Medical History:   Diagnosis Date    Asymptomatic menopausal state     Breast lump     Hyperlipemia     Pap smear abnormality of cervix with ASCUS favoring benign     Plantar  fasciitis        Past Surgical History:   Procedure Laterality Date    CATARACT EXTRACTION Bilateral      SECTION      COLONOSCOPY      [place] ARTHRP KNE CONDYLE&PLATU MEDIAL&LAT COMPARTMENTS Left 2024    Procedure: ARTHROPLASTY KNEE TOTAL W ROBOT;  Surgeon: Lisandro Diez MD;  Location: [facility];  Service: Orthopedics       Family History   Problem Relation Age of Onset    No Known Problems Mother     No Known Problems Father     No Known Problems Sister     No Known Problems Maternal Grandmother     No Known Problems Maternal Grandfather     No Known Problems Paternal Grandmother     No Known Problems Paternal Grandfather     No Known Problems Son     No Known Problems Son        Social History     Occupational History    Occupation: Retired   Tobacco Use    Smoking status: Never    Smokeless tobacco: Never   Vaping Use    Vaping status: Never Used   Substance and Sexual Activity    Alcohol use: Yes     Comment: remote social alcohol use    Drug use: No    Sexual activity: Yes     Partners: Male     Birth control/protection: Post-menopausal         Current Outpatient Medications:     atorvastatin (LIPITOR) 20 mg tablet, , Disp: , Rfl:     cephalexin (KEFLEX) 500 mg capsule, Take 4 tablets p.o. 1 hr prior to dental appointment as instructed, Disp: 20 capsule, Rfl: 1    cholecalciferol (VITAMIN D3) 1,000 units tablet, Take 1 tablet by mouth daily, Disp: , Rfl:     gabapentin (NEURONTIN) 100 mg capsule, , Disp: , Rfl:     levothyroxine 25 mcg tablet, , Disp: , Rfl:     lisinopril (ZESTRIL) 10 mg tablet, , Disp: , Rfl:     metoprolol succinate (TOPROL-XL) 50 mg 24 hr tablet, , Disp: , Rfl:     acetaminophen (TYLENOL) 650 mg CR tablet, Take 1 tablet (650 mg total) by mouth every 8 (eight) hours as needed for mild pain (Patient not taking: Reported on 3/22/2024), Disp: 30 tablet, Rfl: 0    Calcium Ascorbate (VITAMIN C) 500 mg tablet, Take 1 tablet (500 mg total) by mouth 2 (two) times a day, Disp: 60  tablet, Rfl: 0    Allergies   Allergen Reactions    Penicillins Other (See Comments)     Childhood   slept a long time  3 yrs old??       Objective:  Vitals:    08/09/24 1311   BP: 124/65   Pulse: 69       There is no height or weight on file to calculate BMI.    Left Knee Exam     Tenderness   The patient is experiencing no tenderness.     Range of Motion   Extension:  0   Flexion:  120     Other   Erythema: absent  Scars: present (Well-healed vertical midline scar)  Sensation: normal  Pulse: present  Effusion: no effusion present    Comments:  Stable to valgus and varus stress at 0, 30 and 90        Right Shoulder Exam     Tenderness   The patient is experiencing no tenderness.    Range of Motion   Active abduction:  160   Passive abduction:  170   External rotation:  70   Forward flexion:  180   Internal rotation 0 degrees:  L2     Muscle Strength   Abduction: 5/5   Internal rotation: 5/5   External rotation: 5/5     Other   Erythema: absent  Scars: absent  Sensation: normal  Pulse: present          Observations   Left Knee   Negative for effusion.       Physical Exam  Vitals and nursing note reviewed.   Constitutional:       Appearance: She is well-developed.   HENT:      Head: Normocephalic and atraumatic.      Right Ear: External ear normal.      Left Ear: External ear normal.      Nose: Nose normal.   Eyes:      General:         Right eye: No discharge.         Left eye: No discharge.      Conjunctiva/sclera: Conjunctivae normal.   Cardiovascular:      Rate and Rhythm: Normal rate.   Pulmonary:      Effort: Pulmonary effort is normal. No respiratory distress.   Musculoskeletal:      Cervical back: Normal range of motion and neck supple.      Left knee: No effusion.      Comments: As noted in Ortho exam   Skin:     General: Skin is warm and dry.   Neurological:      Mental Status: She is alert and oriented to person, place, and time.   Psychiatric:         Behavior: Behavior normal.         Thought Content:  Thought content normal.         Judgment: Judgment normal.         I have personally reviewed pertinent films in PACS.      X-rays of the left knee obtained today August 9, 2024 demonstrates a stable appearing in appropriate positioned demented total knee arthroplasty.  No evidence of implant failure or lucency.  No acute fracture observed.    This document was created using speech voice recognition software.   Grammatical errors, random word insertions, pronoun errors, and incomplete sentences are an occasional consequence of this system due to software limitations, ambient noise, and hardware issues.   Any formal questions or concerns about content, text, or information contained within the body of this dictation should be directly addressed to the provider for clarification.

## 2024-10-04 RX ORDER — LISINOPRIL 10 MG/1
TABLET ORAL
OUTPATIENT
Start: 2024-10-04

## 2024-10-14 DIAGNOSIS — E03.9 HYPOTHYROIDISM, UNSPECIFIED TYPE: Primary | ICD-10-CM

## 2024-10-14 RX ORDER — LEVOTHYROXINE SODIUM 25 UG/1
25 TABLET ORAL DAILY
Qty: 100 TABLET | Refills: 1 | Status: SHIPPED | OUTPATIENT
Start: 2024-10-14

## 2024-10-15 ENCOUNTER — RA CDI HCC (OUTPATIENT)
Dept: OTHER | Facility: HOSPITAL | Age: 74
End: 2024-10-15

## 2024-10-22 ENCOUNTER — OFFICE VISIT (OUTPATIENT)
Dept: FAMILY MEDICINE CLINIC | Facility: MEDICAL CENTER | Age: 74
End: 2024-10-22
Payer: MEDICARE

## 2024-10-22 VITALS
HEART RATE: 65 BPM | HEIGHT: 59 IN | TEMPERATURE: 97.5 F | DIASTOLIC BLOOD PRESSURE: 78 MMHG | OXYGEN SATURATION: 99 % | SYSTOLIC BLOOD PRESSURE: 120 MMHG | WEIGHT: 154.4 LBS | RESPIRATION RATE: 16 BRPM | BODY MASS INDEX: 31.12 KG/M2

## 2024-10-22 DIAGNOSIS — Z12.31 SCREENING MAMMOGRAM FOR BREAST CANCER: ICD-10-CM

## 2024-10-22 DIAGNOSIS — E03.9 HYPOTHYROIDISM, UNSPECIFIED TYPE: Primary | ICD-10-CM

## 2024-10-22 DIAGNOSIS — I10 ESSENTIAL HYPERTENSION: ICD-10-CM

## 2024-10-22 DIAGNOSIS — R73.09 ELEVATED HEMOGLOBIN A1C: ICD-10-CM

## 2024-10-22 DIAGNOSIS — Z23 ENCOUNTER FOR IMMUNIZATION: ICD-10-CM

## 2024-10-22 DIAGNOSIS — E78.00 HYPERCHOLESTEROLEMIA: ICD-10-CM

## 2024-10-22 PROCEDURE — G0008 ADMIN INFLUENZA VIRUS VAC: HCPCS

## 2024-10-22 PROCEDURE — 90662 IIV NO PRSV INCREASED AG IM: CPT

## 2024-10-22 PROCEDURE — 99214 OFFICE O/P EST MOD 30 MIN: CPT

## 2024-10-22 RX ORDER — DICLOFENAC SODIUM 75 MG/1
75 TABLET, DELAYED RELEASE ORAL 2 TIMES DAILY
COMMUNITY

## 2024-10-22 NOTE — PROGRESS NOTES
Assessment/Plan:    Influenza vaccine updated today.  Will return for COVID vaccination in 2 weeks with nurse.  Also advised about RSV and shingles vaccinations to be done at the pharmacy.  Fasting lab orders placed, to be done prior to next visit.  Return in 4 to 6 months for AWV, sooner if needed.  Declines any further colonoscopy and Cologuard.     1. Hypothyroidism, unspecified type  Stable on most recent labs.  Continue current dose of levothyroxine.  - TSH, 3rd generation with Free T4 reflex; Future    2. Essential hypertension  Blood pressure stable in office today 120/78.  Continue lisinopril and metoprolol.  - Comprehensive metabolic panel; Future  - CBC and differential; Future    3. Hypercholesterolemia  Stable on most recent labs.  Continue atorvastatin.  - Lipid panel; Future    4. Elevated hemoglobin A1c  A1c elevated on labs from 6/2024 at 6.0%.  No current medication regimen.  No history of diabetes.  Recheck A1c prior to next office visit.  - Hemoglobin A1C; Future    5. Screening mammogram for breast cancer  Mammogram due in March, order placed.  - Mammo screening bilateral w 3d and cad; Future    6. Encounter for immunization  - influenza vaccine, high-dose, PF 0.5 mL (Fluzone High Dose)      Subjective:      Patient ID: Carina Bang is a 73 y.o. female.    73-year-old female presents for follow-up hypertension, hypothyroidism and hyperlipidemia.  Compliant with all medications.  She is doing well overall, working on moving her  over to a memory care unit due to worsening Alzheimer's.  She offers no concerns at this time.           The following portions of the patient's history were reviewed and updated as appropriate: allergies, past family history, past medical history, past social history, past surgical history, and problem list.    Review of Systems   Constitutional: Negative.    HENT: Negative.     Eyes: Negative.    Respiratory: Negative.     Cardiovascular: Negative.   "  Gastrointestinal: Negative.    Endocrine: Negative.    Genitourinary: Negative.    Musculoskeletal: Negative.    Skin: Negative.    Allergic/Immunologic: Negative.    Neurological: Negative.    Hematological: Negative.    Psychiatric/Behavioral: Negative.           Objective:      /78 (BP Location: Left arm, Patient Position: Sitting, Cuff Size: Standard)   Pulse 65   Temp 97.5 °F (36.4 °C) (Temporal)   Resp 16   Ht 4' 11\" (1.499 m)   Wt 70 kg (154 lb 6.4 oz)   SpO2 99%   BMI 31.19 kg/m²          Physical Exam  Vitals and nursing note reviewed.   Constitutional:       General: She is not in acute distress.     Appearance: Normal appearance. She is not ill-appearing.   HENT:      Head: Normocephalic and atraumatic.   Cardiovascular:      Rate and Rhythm: Normal rate and regular rhythm.      Pulses: Normal pulses.      Heart sounds: Normal heart sounds.   Pulmonary:      Effort: Pulmonary effort is normal.      Breath sounds: Normal breath sounds.   Skin:     General: Skin is warm and dry.   Neurological:      General: No focal deficit present.      Mental Status: She is alert and oriented to person, place, and time. Mental status is at baseline.   Psychiatric:         Mood and Affect: Mood normal.         Behavior: Behavior normal.         Thought Content: Thought content normal.                    YA Garnica  "

## 2024-11-15 ENCOUNTER — APPOINTMENT (OUTPATIENT)
Dept: RADIOLOGY | Facility: MEDICAL CENTER | Age: 74
End: 2024-11-15
Payer: MEDICARE

## 2024-11-15 ENCOUNTER — OFFICE VISIT (OUTPATIENT)
Dept: OBGYN CLINIC | Facility: MEDICAL CENTER | Age: 74
End: 2024-11-15
Payer: MEDICARE

## 2024-11-15 VITALS
WEIGHT: 154 LBS | SYSTOLIC BLOOD PRESSURE: 116 MMHG | DIASTOLIC BLOOD PRESSURE: 69 MMHG | HEIGHT: 59 IN | HEART RATE: 65 BPM | BODY MASS INDEX: 31.04 KG/M2

## 2024-11-15 DIAGNOSIS — Z96.652 STATUS POST TOTAL LEFT KNEE REPLACEMENT: ICD-10-CM

## 2024-11-15 DIAGNOSIS — Z96.652 STATUS POST TOTAL LEFT KNEE REPLACEMENT: Primary | ICD-10-CM

## 2024-11-15 PROCEDURE — 73560 X-RAY EXAM OF KNEE 1 OR 2: CPT

## 2024-11-15 PROCEDURE — 99213 OFFICE O/P EST LOW 20 MIN: CPT | Performed by: ORTHOPAEDIC SURGERY

## 2024-11-15 NOTE — PROGRESS NOTES
Assessment:   Diagnosis ICD-10-CM Associated Orders   1. Status post total left knee replacement  Z96.652           Plan:  73 y.o. female 9 months status post left total knee arthroplasty doing well postoperatively  Continue weight bearing activities as tolerated   Continue increasing physical activity; able to be as active as patient want to be  The patient was reminded about prophylactic antibiotic use prior to dental visits for the next year   Antibiotics previously prescribed at prior visit  The patient is welcome to return at any point with any new or old issue       The above stated was discussed in layman's terms and the patient expressed understanding.  All questions were answered to the patient's satisfaction.     To do next visit:  Return in about 3 months (around 2/15/2025).      Subjective:   Carina Bang is a 73 y.o. female who presents 9 months s/p left TKA doing well postoperatively.  She reports no pain in her left knee, feels that her range of motion is excellent.  Offers no new complaints today.    Review of systems negative unless otherwise specified in HPI    Past Medical History:   Diagnosis Date    Asymptomatic menopausal state     Breast lump     Hyperlipemia     Pap smear abnormality of cervix with ASCUS favoring benign     Plantar fasciitis        Past Surgical History:   Procedure Laterality Date    CATARACT EXTRACTION Bilateral      SECTION      COLONOSCOPY      WI ARTHRP KNE CONDYLE&PLATU MEDIAL&LAT COMPARTMENTS Left 2024    Procedure: ARTHROPLASTY KNEE TOTAL W ROBOT;  Surgeon: Lisandro Diez MD;  Location: [facility];  Service: Orthopedics       Family History   Problem Relation Age of Onset    No Known Problems Mother     No Known Problems Father     No Known Problems Sister     No Known Problems Maternal Grandmother     No Known Problems Maternal Grandfather     No Known Problems Paternal Grandmother     No Known Problems Paternal Grandfather     No Known Problems  Son     No Known Problems Son        Social History     Occupational History    Occupation: Retired   Tobacco Use    Smoking status: Never    Smokeless tobacco: Never   Vaping Use    Vaping status: Never Used   Substance and Sexual Activity    Alcohol use: Yes     Comment: remote social alcohol use    Drug use: No    Sexual activity: Yes     Partners: Male     Birth control/protection: Post-menopausal         Current Outpatient Medications:     atorvastatin (LIPITOR) 20 mg tablet, , Disp: , Rfl:     cephalexin (KEFLEX) 500 mg capsule, Take 4 tablets p.o. 1 hr prior to dental appointment as instructed, Disp: 20 capsule, Rfl: 1    cholecalciferol (VITAMIN D3) 1,000 units tablet, Take 1 tablet by mouth daily, Disp: , Rfl:     diclofenac (VOLTAREN) 75 mg EC tablet, Take 75 mg by mouth 2 (two) times a day, Disp: , Rfl:     Diclofenac Sodium (VOLTAREN) 1 %, Apply 2 g topically 4 (four) times a day, Disp: , Rfl:     levothyroxine 25 mcg tablet, Take 1 tablet (25 mcg total) by mouth daily, Disp: 100 tablet, Rfl: 1    lisinopril (ZESTRIL) 10 mg tablet, , Disp: , Rfl:     metoprolol succinate (TOPROL-XL) 50 mg 24 hr tablet, , Disp: , Rfl:     Allergies   Allergen Reactions    Penicillins Other (See Comments)     Childhood   slept a long time  3 yrs old??            Vitals:    11/15/24 1325   BP: 116/69   Pulse: 65       Objective:  Physical exam  General: Awake, Alert, Oriented  Eyes: Pupils equal, round and reactive to light  Heart: regular rate and rhythm  Lungs: No audible wheezing  Abdomen: soft                    Ortho Exam    Well healed anterior incision  No erythema and no ecchymosis  Stable to varus and valgus stress  Extensor mechanism intact  Extension 0  Flexion 115  Calf compartments soft and supple  Sensation intact  Toes are warm sensate and mobile       Diagnostics, reviewed and taken today if performed as documented:    left knee x-ray:   - Well aligned prosthesis without evidence of fractures, acute changes,  "or hardware failure         Procedures, if performed today:    Procedures    None performed    Portions of the record may have been created with voice recognition software.  Occasional wrong word or \"sound a like\" substitutions may have occurred due to the inherent limitations of voice recognition software.  Read the chart carefully and recognize, using context, where substitutions have occurred.        "

## 2024-12-02 ENCOUNTER — TELEPHONE (OUTPATIENT)
Dept: OBGYN CLINIC | Facility: HOSPITAL | Age: 74
End: 2024-12-02

## 2024-12-02 NOTE — TELEPHONE ENCOUNTER
Caller: [facility]    Doctor: Rg    Reason for call: Office is requesting protocol for pre meds and how long patient needs to remain on them. Please fax protocol to [phone]    Call back#: [phone]

## 2024-12-03 ENCOUNTER — CLINICAL SUPPORT (OUTPATIENT)
Dept: FAMILY MEDICINE CLINIC | Facility: MEDICAL CENTER | Age: 74
End: 2024-12-03
Payer: MEDICARE

## 2024-12-03 DIAGNOSIS — Z23 ENCOUNTER FOR IMMUNIZATION: Primary | ICD-10-CM

## 2024-12-03 DIAGNOSIS — Z23 NEED FOR COVID-19 VACCINE: ICD-10-CM

## 2024-12-03 PROCEDURE — 90480 ADMN SARSCOV2 VAC 1/ONLY CMP: CPT | Performed by: INTERNAL MEDICINE

## 2024-12-03 PROCEDURE — 91320 SARSCV2 VAC 30MCG TRS-SUC IM: CPT | Performed by: INTERNAL MEDICINE

## 2024-12-04 NOTE — TELEPHONE ENCOUNTER
Caller: Bernie  - [facility]     Doctor: Dr Diez    Reason for call: Bernie is coming for a letter to be faxed over with the Dental Information that Dr Diez provided in the note below.     Thank you   Please fax to :   [phone]    Call back#: [phone]

## 2024-12-31 DIAGNOSIS — E78.00 HYPERCHOLESTEROLEMIA: Primary | ICD-10-CM

## 2024-12-31 DIAGNOSIS — I10 ESSENTIAL HYPERTENSION: ICD-10-CM

## 2024-12-31 NOTE — TELEPHONE ENCOUNTER
Reason for call: Patient stated that she knows these were last refilled by her Cardiologist but is requesting her PCP to refill   [x] Refill   [] Prior Auth  [] Other:     Office:   [x] PCP/Provider - Jose J/ Sue HAYES  [] Specialty/Provider -     atorvastatin (LIPITOR) 20 mg tablet/ Take 1 tablet daily/ Qty 90     lisinopril (ZESTRIL) 10 mg tablet / Take 1 tablet daily/ Qty 90    metoprolol succinate (TOPROL-XL) 50 mg 24 hr tablet / Take 1 tablet daily/ Qty 90    Pharmacy: [facility] - [place], [place] - [address] [place] [address]      Does the patient have enough for 3 days?   [x] Yes   [] No - Send as HP to POD

## 2025-01-02 RX ORDER — METOPROLOL SUCCINATE 50 MG/1
50 TABLET, EXTENDED RELEASE ORAL DAILY
Qty: 90 TABLET | Refills: 1 | Status: SHIPPED | OUTPATIENT
Start: 2025-01-02

## 2025-01-02 RX ORDER — ATORVASTATIN CALCIUM 20 MG/1
20 TABLET, FILM COATED ORAL DAILY
Qty: 90 TABLET | Refills: 1 | Status: SHIPPED | OUTPATIENT
Start: 2025-01-02

## 2025-01-02 RX ORDER — LISINOPRIL 10 MG/1
10 TABLET ORAL DAILY
Qty: 90 TABLET | Refills: 1 | Status: SHIPPED | OUTPATIENT
Start: 2025-01-02

## 2025-03-21 ENCOUNTER — HOSPITAL ENCOUNTER (OUTPATIENT)
Dept: RADIOLOGY | Facility: MEDICAL CENTER | Age: 75
Discharge: HOME | End: 2025-03-21
Payer: MEDICARE

## 2025-03-21 VITALS — BODY MASS INDEX: 31.04 KG/M2 | HEIGHT: 59 IN | WEIGHT: 154 LBS

## 2025-03-21 DIAGNOSIS — Z12.31 SCREENING MAMMOGRAM FOR BREAST CANCER: ICD-10-CM

## 2025-03-21 DIAGNOSIS — Z12.31 ENCOUNTER FOR SCREENING MAMMOGRAM FOR MALIGNANT NEOPLASM OF BREAST: ICD-10-CM

## 2025-03-21 PROCEDURE — 77063 BREAST TOMOSYNTHESIS BI: CPT

## 2025-03-21 PROCEDURE — 77067 SCR MAMMO BI INCL CAD: CPT

## 2025-03-24 ENCOUNTER — RESULTS FOLLOW-UP (OUTPATIENT)
Dept: FAMILY MEDICINE CLINIC | Facility: MEDICAL CENTER | Age: 75
End: 2025-03-24

## 2025-03-28 DIAGNOSIS — E03.9 HYPOTHYROIDISM, UNSPECIFIED TYPE: ICD-10-CM

## 2025-03-28 RX ORDER — LEVOTHYROXINE SODIUM 25 UG/1
25 TABLET ORAL DAILY
Qty: 90 TABLET | Refills: 1 | Status: SHIPPED | OUTPATIENT
Start: 2025-03-28

## 2025-05-28 ENCOUNTER — APPOINTMENT (OUTPATIENT)
Dept: LAB | Facility: MEDICAL CENTER | Age: 75
End: 2025-05-28
Payer: MEDICARE

## 2025-05-28 DIAGNOSIS — R73.09 ELEVATED HEMOGLOBIN A1C: ICD-10-CM

## 2025-05-28 DIAGNOSIS — E03.9 HYPOTHYROIDISM, UNSPECIFIED TYPE: ICD-10-CM

## 2025-05-28 DIAGNOSIS — I10 ESSENTIAL HYPERTENSION: ICD-10-CM

## 2025-05-28 DIAGNOSIS — E78.00 HYPERCHOLESTEROLEMIA: ICD-10-CM

## 2025-05-28 LAB
ALBUMIN SERPL BCG-MCNC: 3.4 G/DL (ref 3.5–5)
ALP SERPL-CCNC: 114 U/L (ref 34–104)
ALT SERPL W P-5'-P-CCNC: 24 U/L (ref 7–52)
ANION GAP SERPL CALCULATED.3IONS-SCNC: 8 MMOL/L (ref 4–13)
AST SERPL W P-5'-P-CCNC: 20 U/L (ref 13–39)
BASOPHILS # BLD AUTO: 0.02 THOUSANDS/ÂΜL (ref 0–0.1)
BASOPHILS NFR BLD AUTO: 0 % (ref 0–1)
BILIRUB SERPL-MCNC: 0.4 MG/DL (ref 0.2–1)
BUN SERPL-MCNC: 11 MG/DL (ref 5–25)
CALCIUM ALBUM COR SERPL-MCNC: 9.3 MG/DL (ref 8.3–10.1)
CALCIUM SERPL-MCNC: 8.8 MG/DL (ref 8.4–10.2)
CHLORIDE SERPL-SCNC: 108 MMOL/L (ref 96–108)
CHOLEST SERPL-MCNC: 173 MG/DL (ref ?–200)
CO2 SERPL-SCNC: 28 MMOL/L (ref 21–32)
CREAT SERPL-MCNC: 0.75 MG/DL (ref 0.6–1.3)
EOSINOPHIL # BLD AUTO: 0.15 THOUSAND/ÂΜL (ref 0–0.61)
EOSINOPHIL NFR BLD AUTO: 2 % (ref 0–6)
ERYTHROCYTE [DISTWIDTH] IN BLOOD BY AUTOMATED COUNT: 13.8 % (ref 11.6–15.1)
EST. AVERAGE GLUCOSE BLD GHB EST-MCNC: 128 MG/DL
GFR SERPL CREATININE-BSD FRML MDRD: 78 ML/MIN/1.73SQ M
GLUCOSE P FAST SERPL-MCNC: 89 MG/DL (ref 65–99)
HBA1C MFR BLD: 6.1 %
HCT VFR BLD AUTO: 43.9 % (ref 34.8–46.1)
HDLC SERPL-MCNC: 60 MG/DL
HGB BLD-MCNC: 13.5 G/DL (ref 11.5–15.4)
IMM GRANULOCYTES # BLD AUTO: 0.02 THOUSAND/UL (ref 0–0.2)
IMM GRANULOCYTES NFR BLD AUTO: 0 % (ref 0–2)
LDLC SERPL CALC-MCNC: 92 MG/DL (ref 0–100)
LYMPHOCYTES # BLD AUTO: 1.5 THOUSANDS/ÂΜL (ref 0.6–4.47)
LYMPHOCYTES NFR BLD AUTO: 24 % (ref 14–44)
MCH RBC QN AUTO: 28.9 PG (ref 26.8–34.3)
MCHC RBC AUTO-ENTMCNC: 30.8 G/DL (ref 31.4–37.4)
MCV RBC AUTO: 94 FL (ref 82–98)
MONOCYTES # BLD AUTO: 0.66 THOUSAND/ÂΜL (ref 0.17–1.22)
MONOCYTES NFR BLD AUTO: 11 % (ref 4–12)
NEUTROPHILS # BLD AUTO: 3.84 THOUSANDS/ÂΜL (ref 1.85–7.62)
NEUTS SEG NFR BLD AUTO: 63 % (ref 43–75)
NONHDLC SERPL-MCNC: 113 MG/DL
NRBC BLD AUTO-RTO: 0 /100 WBCS
PLATELET # BLD AUTO: 218 THOUSANDS/UL (ref 149–390)
PMV BLD AUTO: 11 FL (ref 8.9–12.7)
POTASSIUM SERPL-SCNC: 4.4 MMOL/L (ref 3.5–5.3)
PROT SERPL-MCNC: 6.1 G/DL (ref 6.4–8.4)
RBC # BLD AUTO: 4.67 MILLION/UL (ref 3.81–5.12)
SODIUM SERPL-SCNC: 144 MMOL/L (ref 135–147)
TRIGL SERPL-MCNC: 107 MG/DL (ref ?–150)
TSH SERPL DL<=0.05 MIU/L-ACNC: 2.12 UIU/ML (ref 0.45–4.5)
WBC # BLD AUTO: 6.19 THOUSAND/UL (ref 4.31–10.16)

## 2025-05-28 PROCEDURE — 84443 ASSAY THYROID STIM HORMONE: CPT

## 2025-05-28 PROCEDURE — 85025 COMPLETE CBC W/AUTO DIFF WBC: CPT

## 2025-05-28 PROCEDURE — 80053 COMPREHEN METABOLIC PANEL: CPT

## 2025-05-28 PROCEDURE — 36415 COLL VENOUS BLD VENIPUNCTURE: CPT

## 2025-05-28 PROCEDURE — 83036 HEMOGLOBIN GLYCOSYLATED A1C: CPT

## 2025-05-28 PROCEDURE — 80061 LIPID PANEL: CPT

## 2025-05-29 ENCOUNTER — RESULTS FOLLOW-UP (OUTPATIENT)
Dept: FAMILY MEDICINE CLINIC | Facility: CLINIC | Age: 75
End: 2025-05-29

## 2025-06-02 ENCOUNTER — OFFICE VISIT (OUTPATIENT)
Dept: FAMILY MEDICINE CLINIC | Facility: MEDICAL CENTER | Age: 75
End: 2025-06-02
Payer: MEDICARE

## 2025-06-02 VITALS
DIASTOLIC BLOOD PRESSURE: 72 MMHG | HEART RATE: 65 BPM | RESPIRATION RATE: 16 BRPM | WEIGHT: 160.4 LBS | SYSTOLIC BLOOD PRESSURE: 116 MMHG | OXYGEN SATURATION: 95 % | TEMPERATURE: 97.6 F | BODY MASS INDEX: 32.34 KG/M2 | HEIGHT: 59 IN

## 2025-06-02 DIAGNOSIS — E03.9 ACQUIRED HYPOTHYROIDISM: ICD-10-CM

## 2025-06-02 DIAGNOSIS — E78.00 HYPERCHOLESTEROLEMIA: ICD-10-CM

## 2025-06-02 DIAGNOSIS — G62.9 PERIPHERAL POLYNEUROPATHY: ICD-10-CM

## 2025-06-02 DIAGNOSIS — Z00.00 MEDICARE ANNUAL WELLNESS VISIT, SUBSEQUENT: ICD-10-CM

## 2025-06-02 DIAGNOSIS — R73.9 HYPERGLYCEMIA: ICD-10-CM

## 2025-06-02 DIAGNOSIS — I10 ESSENTIAL HYPERTENSION: Primary | ICD-10-CM

## 2025-06-02 DIAGNOSIS — E66.811 OBESITY (BMI 30.0-34.9): ICD-10-CM

## 2025-06-02 PROBLEM — Z01.89 ENCOUNTER FOR GERIATRIC ASSESSMENT: Status: RESOLVED | Noted: 2024-01-09 | Resolved: 2025-06-02

## 2025-06-02 PROCEDURE — G2211 COMPLEX E/M VISIT ADD ON: HCPCS | Performed by: INTERNAL MEDICINE

## 2025-06-02 PROCEDURE — 99214 OFFICE O/P EST MOD 30 MIN: CPT | Performed by: INTERNAL MEDICINE

## 2025-06-02 PROCEDURE — G0439 PPPS, SUBSEQ VISIT: HCPCS | Performed by: INTERNAL MEDICINE

## 2025-06-02 RX ORDER — METOPROLOL SUCCINATE 50 MG/1
50 TABLET, EXTENDED RELEASE ORAL DAILY
Qty: 90 TABLET | Refills: 1 | Status: SHIPPED | OUTPATIENT
Start: 2025-06-02

## 2025-06-02 RX ORDER — LEVOTHYROXINE SODIUM 25 UG/1
25 TABLET ORAL DAILY
Qty: 90 TABLET | Refills: 1 | Status: SHIPPED | OUTPATIENT
Start: 2025-06-02

## 2025-06-02 RX ORDER — LISINOPRIL 10 MG/1
10 TABLET ORAL DAILY
Qty: 90 TABLET | Refills: 1 | Status: SHIPPED | OUTPATIENT
Start: 2025-06-02

## 2025-06-02 RX ORDER — GABAPENTIN 100 MG/1
100 CAPSULE ORAL 2 TIMES DAILY
Qty: 180 CAPSULE | Refills: 3 | Status: SHIPPED | OUTPATIENT
Start: 2025-06-02

## 2025-06-02 RX ORDER — ATORVASTATIN CALCIUM 20 MG/1
20 TABLET, FILM COATED ORAL DAILY
Qty: 90 TABLET | Refills: 1 | Status: SHIPPED | OUTPATIENT
Start: 2025-06-02

## 2025-06-02 NOTE — ASSESSMENT & PLAN NOTE
Orders:    CBC and differential; Future    Comprehensive metabolic panel; Future    Lipid panel; Future    TSH, 3rd generation; Future    lisinopril (ZESTRIL) 10 mg tablet; Take 1 tablet (10 mg total) by mouth daily    metoprolol succinate (TOPROL-XL) 50 mg 24 hr tablet; Take 1 tablet (50 mg total) by mouth daily  Blood pressure is stable, continue the same medication

## 2025-06-02 NOTE — PATIENT INSTRUCTIONS
Medicare Preventive Visit Patient Instructions  Thank you for completing your Welcome to Medicare Visit or Medicare Annual Wellness Visit today. Your next wellness visit will be due in one year (6/3/2026).  The screening/preventive services that you may require over the next 5-10 years are detailed below. Some tests may not apply to you based off risk factors and/or age. Screening tests ordered at today's visit but not completed yet may show as past due. Also, please note that scanned in results may not display below.  Preventive Screenings:  Service Recommendations Previous Testing/Comments   Colorectal Cancer Screening  * Colonoscopy    * Fecal Occult Blood Test (FOBT)/Fecal Immunochemical Test (FIT)  * Fecal DNA/Cologuard Test  * Flexible Sigmoidoscopy Age: 45-75 years old   Colonoscopy: every 10 years (may be performed more frequently if at higher risk)  OR  FOBT/FIT: every 1 year  OR  Cologuard: every 3 years  OR  Sigmoidoscopy: every 5 years  Screening may be recommended earlier than age 45 if at higher risk for colorectal cancer. Also, an individualized decision between you and your healthcare provider will decide whether screening between the ages of 76-85 would be appropriate. Colonoscopy: Not on file  FOBT/FIT: Not on file  Cologuard: Not on file  Sigmoidoscopy: Not on file          Breast Cancer Screening Age: 40+ years old  Frequency: every 1-2 years  Not required if history of left and right mastectomy Mammogram: 03/21/2025    Screening Current   Cervical Cancer Screening Between the ages of 21-29, pap smear recommended once every 3 years.   Between the ages of 30-65, can perform pap smear with HPV co-testing every 5 years.   Recommendations may differ for women with a history of total hysterectomy, cervical cancer, or abnormal pap smears in past. Pap Smear: 10/26/2020    Screening Not Indicated   Hepatitis C Screening Once for adults born between 1945 and 1965  More frequently in patients at high risk  for Hepatitis C Hep C Antibody: Not on file        Diabetes Screening 1-2 times per year if you're at risk for diabetes or have pre-diabetes Fasting glucose: 89 mg/dL (5/28/2025)  A1C: 6.1 % (5/28/2025)  Screening Current   Cholesterol Screening Once every 5 years if you don't have a lipid disorder. May order more often based on risk factors. Lipid panel: 05/28/2025    Screening Not Indicated  History Lipid Disorder     Other Preventive Screenings Covered by Medicare:  Abdominal Aortic Aneurysm (AAA) Screening: covered once if your at risk. You're considered to be at risk if you have a family history of AAA.  Lung Cancer Screening: covers low dose CT scan once per year if you meet all of the following conditions: (1) Age 55-77; (2) No signs or symptoms of lung cancer; (3) Current smoker or have quit smoking within the last 15 years; (4) You have a tobacco smoking history of at least 20 pack years (packs per day multiplied by number of years you smoked); (5) You get a written order from a healthcare provider.  Glaucoma Screening: covered annually if you're considered high risk: (1) You have diabetes OR (2) Family history of glaucoma OR (3)  aged 50 and older OR (4)  American aged 65 and older  Osteoporosis Screening: covered every 2 years if you meet one of the following conditions: (1) You're estrogen deficient and at risk for osteoporosis based off medical history and other findings; (2) Have a vertebral abnormality; (3) On glucocorticoid therapy for more than 3 months; (4) Have primary hyperparathyroidism; (5) On osteoporosis medications and need to assess response to drug therapy.   Last bone density test (DXA Scan): 07/22/2021.  HIV Screening: covered annually if you're between the age of 15-65. Also covered annually if you are younger than 15 and older than 65 with risk factors for HIV infection. For pregnant patients, it is covered up to 3 times per  pregnancy.    Immunizations:  Immunization Recommendations   Influenza Vaccine Annual influenza vaccination during flu season is recommended for all persons aged >= 6 months who do not have contraindications   Pneumococcal Vaccine   * Pneumococcal conjugate vaccine = PCV13 (Prevnar 13), PCV15 (Vaxneuvance), PCV20 (Prevnar 20)  * Pneumococcal polysaccharide vaccine = PPSV23 (Pneumovax) Adults 19-65 yo with certain risk factors or if 65+ yo  If never received any pneumonia vaccine: recommend Prevnar 20 (PCV20)  Give PCV20 if previously received 1 dose of PCV13 or PPSV23   Hepatitis B Vaccine 3 dose series if at intermediate or high risk (ex: diabetes, end stage renal disease, liver disease)   Respiratory syncytial virus (RSV) Vaccine - COVERED BY MEDICARE PART D  * RSVPreF3 (Arexvy) CDC recommends that adults 60 years of age and older may receive a single dose of RSV vaccine using shared clinical decision-making (SCDM)   Tetanus (Td) Vaccine - COST NOT COVERED BY MEDICARE PART B Following completion of primary series, a booster dose should be given every 10 years to maintain immunity against tetanus. Td may also be given as tetanus wound prophylaxis.   Tdap Vaccine - COST NOT COVERED BY MEDICARE PART B Recommended at least once for all adults. For pregnant patients, recommended with each pregnancy.   Shingles Vaccine (Shingrix) - COST NOT COVERED BY MEDICARE PART B  2 shot series recommended in those 19 years and older who have or will have weakened immune systems or those 50 years and older     Health Maintenance Due:      Topic Date Due   • Hepatitis C Screening  Never done   • Colorectal Cancer Screening  10/22/2025 (Originally 12/13/1995)   • Breast Cancer Screening: Mammogram  03/21/2026     Immunizations Due:      Topic Date Due   • COVID-19 Vaccine (6 - 2024-25 season) 06/03/2025     Advance Directives   What are advance directives?  Advance directives are legal documents that state your wishes and plans for  medical care. These plans are made ahead of time in case you lose your ability to make decisions for yourself. Advance directives can apply to any medical decision, such as the treatments you want, and if you want to donate organs.   What are the types of advance directives?  There are many types of advance directives, and each state has rules about how to use them. You may choose a combination of any of the following:  Living will:  This is a written record of the treatment you want. You can also choose which treatments you do not want, which to limit, and which to stop at a certain time. This includes surgery, medicine, IV fluid, and tube feedings.   Durable power of  for healthcare (DPAHC):  This is a written record that states who you want to make healthcare choices for you when you are unable to make them for yourself. This person, called a proxy, is usually a family member or a friend. You may choose more than 1 proxy.  Do not resuscitate (DNR) order:  A DNR order is used in case your heart stops beating or you stop breathing. It is a request not to have certain forms of treatment, such as CPR. A DNR order may be included in other types of advance directives.  Medical directive:  This covers the care that you want if you are in a coma, near death, or unable to make decisions for yourself. You can list the treatments you want for each condition. Treatment may include pain medicine, surgery, blood transfusions, dialysis, IV or tube feedings, and a ventilator (breathing machine).  Values history:  This document has questions about your views, beliefs, and how you feel and think about life. This information can help others choose the care that you would choose.  Why are advance directives important?  An advance directive helps you control your care. Although spoken wishes may be used, it is better to have your wishes written down. Spoken wishes can be misunderstood, or not followed. Treatments may be given  even if you do not want them. An advance directive may make it easier for your family to make difficult choices about your care.   Weight Management   Why it is important to manage your weight:  Being overweight increases your risk of health conditions such as heart disease, high blood pressure, type 2 diabetes, and certain types of cancer. It can also increase your risk for osteoarthritis, sleep apnea, and other respiratory problems. Aim for a slow, steady weight loss. Even a small amount of weight loss can lower your risk of health problems.  How to lose weight safely:  A safe and healthy way to lose weight is to eat fewer calories and get regular exercise. You can lose up about 1 pound a week by decreasing the number of calories you eat by 500 calories each day.   Healthy meal plan for weight management:  A healthy meal plan includes a variety of foods, contains fewer calories, and helps you stay healthy. A healthy meal plan includes the following:  Eat whole-grain foods more often.  A healthy meal plan should contain fiber. Fiber is the part of grains, fruits, and vegetables that is not broken down by your body. Whole-grain foods are healthy and provide extra fiber in your diet. Some examples of whole-grain foods are whole-wheat breads and pastas, oatmeal, brown rice, and bulgur.  Eat a variety of vegetables every day.  Include dark, leafy greens such as spinach, kale, ezra greens, and mustard greens. Eat yellow and orange vegetables such as carrots, sweet potatoes, and winter squash.   Eat a variety of fruits every day.  Choose fresh or canned fruit (canned in its own juice or light syrup) instead of juice. Fruit juice has very little or no fiber.  Eat low-fat dairy foods.  Drink fat-free (skim) milk or 1% milk. Eat fat-free yogurt and low-fat cottage cheese. Try low-fat cheeses such as mozzarella and other reduced-fat cheeses.  Choose meat and other protein foods that are low in fat.  Choose beans or other  legumes such as split peas or lentils. Choose fish, skinless poultry (chicken or turkey), or lean cuts of red meat (beef or pork). Before you cook meat or poultry, cut off any visible fat.   Use less fat and oil.  Try baking foods instead of frying them. Add less fat, such as margarine, sour cream, regular salad dressing and mayonnaise to foods. Eat fewer high-fat foods. Some examples of high-fat foods include french fries, doughnuts, ice cream, and cakes.  Eat fewer sweets.  Limit foods and drinks that are high in sugar. This includes candy, cookies, regular soda, and sweetened drinks.  Exercise:  Exercise at least 30 minutes per day on most days of the week. Some examples of exercise include walking, biking, dancing, and swimming. You can also fit in more physical activity by taking the stairs instead of the elevator or parking farther away from stores. Ask your healthcare provider about the best exercise plan for you.    © Copyright [employer] 2018 Information is for End User's use only and may not be sold, redistributed or otherwise used for commercial purposes. All illustrations and images included in CareNotes® are the copyrighted property of [employer] or [employer]

## 2025-06-02 NOTE — ASSESSMENT & PLAN NOTE
Orders:    gabapentin (NEURONTIN) 100 mg capsule; Take 1 capsule (100 mg total) by mouth 2 (two) times a day  She wanted to start taking the gabapentin again.  It was helping her but she stopped taking it in the past

## 2025-06-02 NOTE — PROGRESS NOTES
Name: Carina Bang      : 1950      MRN: [id number]  Encounter Provider: Yolanda Walker MD  Encounter Date: 2025   Encounter department: [facility] [place]  :  Assessment & Plan  Essential hypertension    Orders:    CBC and differential; Future    Comprehensive metabolic panel; Future    Lipid panel; Future    TSH, 3rd generation; Future    lisinopril (ZESTRIL) 10 mg tablet; Take 1 tablet (10 mg total) by mouth daily    metoprolol succinate (TOPROL-XL) 50 mg 24 hr tablet; Take 1 tablet (50 mg total) by mouth daily  Blood pressure is stable, continue the same medication  Hypercholesterolemia    Orders:    atorvastatin (LIPITOR) 20 mg tablet; Take 1 tablet (20 mg total) by mouth daily    Acquired hypothyroidism    Orders:    levothyroxine 25 mcg tablet; Take 1 tablet (25 mcg total) by mouth daily    Hyperglycemia    Orders:    Hemoglobin A1C; Future  Was advised to cut down on the carbohydrates in her diet as the A1c is high  Peripheral polyneuropathy    Orders:    gabapentin (NEURONTIN) 100 mg capsule; Take 1 capsule (100 mg total) by mouth 2 (two) times a day  She wanted to start taking the gabapentin again.  It was helping her but she stopped taking it in the past  Obesity (BMI 30.0-34.9)    She was advised to lose weight which will help her with her sugar control       Medicare annual wellness visit, subsequent           Depression Screening and Follow-up Plan: Patient was screened for depression during today's encounter. They screened negative with a PHQ-2 score of 0.        Preventive health issues were discussed with patient, and age appropriate screening tests were ordered as noted in patient's After Visit Summary. Personalized health advice and appropriate referrals for health education or preventive services given if needed, as noted in patient's After Visit Summary.    History of Present Illness     HPI  This is a patient of Rebecca who needs to establish  She is here for  follow-up of hypertension, hyperlipidemia, hypothyroidism, hyperglycemia and peripheral neuropathy.  Her blood pressure and cholesterol are very well-controlled.  She was not aware that her A1c was high but she was told to watch her diet.  She was prescribed gabapentin for the neuropathy in the right foot but she stopped taking it.  Now she wants to restart the medication as it was helping her in the past.  She lost her  4 months ago and started eating a poor diet.  She was advised to try to eat better      Patient Care Team:  Yolanda Walker MD as PCP - General (Internal Medicine)    Review of Systems   Constitutional:  Negative for chills, diaphoresis, fatigue and fever.   HENT:  Negative for congestion, ear discharge, ear pain, hearing loss, postnasal drip, rhinorrhea, sinus pressure, sinus pain, sneezing, sore throat and voice change.    Eyes:  Negative for pain, discharge, redness and visual disturbance.   Respiratory:  Negative for cough, chest tightness, shortness of breath and wheezing.    Cardiovascular:  Negative for chest pain, palpitations and leg swelling.   Gastrointestinal:  Negative for abdominal distention, abdominal pain, blood in stool, constipation, diarrhea, nausea and vomiting.   Endocrine: Negative for cold intolerance, heat intolerance, polydipsia, polyphagia and polyuria.   Genitourinary:  Negative for dysuria, flank pain, frequency, hematuria and urgency.   Musculoskeletal:  Negative for arthralgias, back pain, gait problem, joint swelling, myalgias, neck pain and neck stiffness.   Skin:  Negative for rash.   Neurological:  Negative for dizziness, tremors, syncope, facial asymmetry, speech difficulty, weakness, light-headedness, numbness and headaches.   Hematological:  Does not bruise/bleed easily.   Psychiatric/Behavioral:  Negative for behavioral problems, confusion and sleep disturbance. The patient is not nervous/anxious.      Medical History Reviewed by provider this encounter:   Tobacco  Allergies  Meds  Problems  Med Hx  Surg Hx  Fam Hx       Annual Wellness Visit Questionnaire   Carina is here for her Subsequent Wellness visit.     Health Risk Assessment:   Patient rates overall health as good. Patient feels that their physical health rating is same. Patient is satisfied with their life. Eyesight was rated as same. Hearing was rated as same. Patient feels that their emotional and mental health rating is slightly worse. Patients states they are sometimes angry. Patient states they are sometimes unusually tired/fatigued. Pain experienced in the last 7 days has been some. Patient's pain rating has been 5/10. Patient states that she has experienced weight loss or gain in last 6 months.     Depression Screening:   PHQ-2 Score: 0      Fall Risk Screening:   In the past year, patient has experienced: no history of falling in past year      Urinary Incontinence Screening:   Patient has not leaked urine accidently in the last six months.     Home Safety:  Patient does not have trouble with stairs inside or outside of their home. Patient has working smoke alarms and has working carbon monoxide detector. Home safety hazards include: none.     Nutrition:   Current diet is Unhealthy and Limited junk food.     Medications:   Patient is not currently taking any over-the-counter supplements. Patient is able to manage medications.     Activities of Daily Living (ADLs)/Instrumental Activities of Daily Living (IADLs):   Walk and transfer into and out of bed and chair?: Yes  Dress and groom yourself?: Yes    Bathe or shower yourself?: Yes    Feed yourself? Yes  Do your laundry/housekeeping?: Yes  Manage your money, pay your bills and track your expenses?: Yes  Make your own meals?: Yes    Do your own shopping?: Yes    Previous Hospitalizations:   Any hospitalizations or ED visits within the last 12 months?: No      Advance Care Planning:   Living will: Yes    Durable POA for healthcare: Yes     Advanced directive: Yes      Preventive Screenings      Cardiovascular Screening:    General: Screening Not Indicated and History Lipid Disorder      Diabetes Screening:     General: Screening Current      Breast Cancer Screening:     General: Screening Current      Cervical Cancer Screening:    General: Screening Not Indicated      Lung Cancer Screening:     General: Screening Not Indicated    Immunizations:  - Immunizations due: Zoster (Shingrix)    Screening, Brief Intervention, and Referral to Treatment (SBIRT)     Screening  Typical number of drinks in a day: 0  Typical number of drinks in a week: 0  Interpretation: Low risk drinking behavior.    AUDIT-C Screenin) How often did you have a drink containing alcohol in the past year? monthly or less  2) How many drinks did you have on a typical day when you were drinking in the past year? 1 to 2  3) How often did you have 6 or more drinks on one occasion in the past year? never    AUDIT-C Score: 1  Interpretation: Score 0-2 (female): Negative screen for alcohol misuse    Single Item Drug Screening:  How often have you used an illegal drug (including marijuana) or a prescription medication for non-medical reasons in the past year? never    Single Item Drug Screen Score: 0  Interpretation: Negative screen for possible drug use disorder    Social Drivers of Health     Food Insecurity: No Food Insecurity (2025)    Nursing - Inadequate Food Risk Classification     Worried About Running Out of Food in the Last Year: Never true     Ran Out of Food in the Last Year: Never true   Transportation Needs: No Transportation Needs (2025)    PRAPARE - Transportation     Lack of Transportation (Medical): No     Lack of Transportation (Non-Medical): No   Housing Stability: Low Risk  (2025)    Housing Stability Vital Sign     Unable to Pay for Housing in the Last Year: No     Number of Times Moved in the Last Year: 0     Homeless in the Last Year: No   Utilities:  "Not At Risk (6/2/2025)    [facility] Utilities     Threatened with loss of utilities: No     No results found.    Objective   /72 (BP Location: Left arm, Patient Position: Sitting, Cuff Size: Large)   Pulse 65   Temp 97.6 °F (36.4 °C) (Temporal)   Resp 16   Ht 4' 11\" (1.499 m)   Wt 72.8 kg (160 lb 6.4 oz)   SpO2 95%   BMI 32.40 kg/m²     Physical Exam  Constitutional:       General: She is not in acute distress.     Appearance: She is well-developed. She is not diaphoretic.   HENT:      Head: Normocephalic and atraumatic.      Right Ear: External ear normal.      Left Ear: External ear normal.      Nose: Nose normal.     Eyes:      General: No scleral icterus.        Right eye: No discharge.         Left eye: No discharge.      Conjunctiva/sclera: Conjunctivae normal.       Cardiovascular:      Rate and Rhythm: Normal rate and regular rhythm.      Heart sounds: Normal heart sounds. No murmur heard.     No friction rub. No gallop.   Pulmonary:      Effort: Pulmonary effort is normal. No respiratory distress.      Breath sounds: Normal breath sounds. No wheezing or rales.   Abdominal:      General: Bowel sounds are normal. There is no distension.      Palpations: Abdomen is soft.      Tenderness: There is no abdominal tenderness. There is no guarding or rebound.     Musculoskeletal:         General: No tenderness.     Skin:     General: Skin is warm and dry.      Findings: No erythema or rash.     Neurological:      Mental Status: She is alert and oriented to person, place, and time.      Cranial Nerves: No cranial nerve deficit.      Sensory: No sensory deficit.      Motor: No abnormal muscle tone.     Psychiatric:         Behavior: Behavior normal.         "

## 2025-06-02 NOTE — ASSESSMENT & PLAN NOTE
Orders:    Hemoglobin A1C; Future  Was advised to cut down on the carbohydrates in her diet as the A1c is high

## 2025-06-09 ENCOUNTER — OFFICE VISIT (OUTPATIENT)
Dept: FAMILY MEDICINE CLINIC | Facility: MEDICAL CENTER | Age: 75
End: 2025-06-09
Payer: MEDICARE

## 2025-06-09 VITALS
RESPIRATION RATE: 18 BRPM | HEART RATE: 71 BPM | SYSTOLIC BLOOD PRESSURE: 120 MMHG | HEIGHT: 59 IN | DIASTOLIC BLOOD PRESSURE: 70 MMHG | WEIGHT: 159.4 LBS | BODY MASS INDEX: 32.13 KG/M2 | TEMPERATURE: 97.9 F | OXYGEN SATURATION: 98 %

## 2025-06-09 DIAGNOSIS — B02.9 HERPES ZOSTER WITHOUT COMPLICATION: Primary | ICD-10-CM

## 2025-06-09 PROCEDURE — 99213 OFFICE O/P EST LOW 20 MIN: CPT | Performed by: STUDENT IN AN ORGANIZED HEALTH CARE EDUCATION/TRAINING PROGRAM

## 2025-06-09 PROCEDURE — G2211 COMPLEX E/M VISIT ADD ON: HCPCS | Performed by: STUDENT IN AN ORGANIZED HEALTH CARE EDUCATION/TRAINING PROGRAM

## 2025-06-09 RX ORDER — VALACYCLOVIR HYDROCHLORIDE 1 G/1
1000 TABLET, FILM COATED ORAL EVERY 8 HOURS
Qty: 21 TABLET | Refills: 0 | Status: SHIPPED | OUTPATIENT
Start: 2025-06-09 | End: 2025-06-16

## 2025-06-09 NOTE — PROGRESS NOTES
":  Assessment & Plan  Herpes zoster without complication  Advised about scheduled pain control with Tylenol and/or NSAID  Instructed about adherence with antiviral therapy  Educational material distributed  Discussed Shingrix vaccine once rash completely resolved  Orders:    valACYclovir (VALTREX) 1,000 mg tablet; Take 1 tablet (1,000 mg total) by mouth every 8 (eight) hours for 7 days        History of Present Illness     Carina Bang is a 74 y.o. female who presents for acute visit.  She is a patient of Dr. Walker.  Right hip area.  Patient states she was moving furniture that day so attributed the pain to that.  Saturday morning right hip area was still hurting so she used Aspercreme patch.  Sunday she noticed bumps/rash in the area, attributed to irritation from patch.  Patient states she also experience some discomfort pelvic area that has since resolved.  Patient describes pain as \" neuralgia ache\".     Review of Systems    As noted in HPI   Objective   /70 (BP Location: Left arm, Patient Position: Sitting, Cuff Size: Large)   Pulse 71   Temp 97.9 °F (36.6 °C) (Temporal)   Resp 18   Ht 4' 11\" (1.499 m)   Wt 72.3 kg (159 lb 6.4 oz)   SpO2 98%   BMI 32.19 kg/m²      Physical Exam  Vitals reviewed.   Constitutional:       General: She is not in acute distress.     Appearance: Normal appearance. She is not toxic-appearing.   HENT:      Head: Normocephalic and atraumatic.     Eyes:      Conjunctiva/sclera: Conjunctivae normal.     Pulmonary:      Effort: Pulmonary effort is normal.     Skin:     Findings: Rash present.           Comments: Small vesicles scant on pink patches right sided dermatomal pattern as pictured     Neurological:      Mental Status: She is alert and oriented to person, place, and time.     Psychiatric:         Mood and Affect: Mood normal.         Behavior: Behavior normal.         Thought Content: Thought content normal.         Judgment: Judgment normal.           " 79.4

## 2025-06-09 NOTE — PATIENT INSTRUCTIONS
"Can take Tylenol scheduled for next couple of Friday, June 6 she started to experience some achiness days every 4-6 hours and NSAID such as Advil as needed if any breakthrough pain      Patient Education     Shingles   The Basics   Written by the doctors and editors at [facility]   What is shingles? -- Shingles is a painful rash that is usually shaped like a band (picture 1). It can affect people of all ages, but it is most common in those older than 50. It is also more common in people whose immune system (the body's infection-fighting system) is weaker than normal. Another name for shingles is \"herpes zoster.\"  Shingles is caused by a virus called varicella-zoster virus. This is the same virus that causes chickenpox. After someone has chickenpox, the virus sometimes hides out, \"asleep\" in the body. Years later, it can \"wake up\" and cause shingles. The first time a person is infected with that virus, they get chickenpox, not shingles.  Is shingles contagious? -- In a way, yes. It is not possible to \"catch\" shingles from someone who has the rash. But if you have never had chickenpox or gotten the chickenpox vaccine, it is possible to \"catch\" the virus and then get sick with chickenpox. Shingles and chickenpox are caused by the same virus.  You probably will not catch the virus (or get chickenpox) if you:   Had chickenpox or shingles in the past   Had the chickenpox vaccine   Were born in the  before 1980 (most people born before 1980 have had chickenpox even if they don't remember it)  If you have never had chickenpox or the chickenpox vaccine, avoid contact with anyone who has shingles. It is especially important that you do not touch their rash. If you do, you could get sick with chickenpox. In rare cases, it is possible to get chickenpox from just being near someone with shingles.  Some people have a higher risk than others for getting very sick or having other problems because of chickenpox. People at highest " "risk include:   People who are pregnant - Pregnant people can pass the chickenpox virus to their growing baby.   Premature babies   People whose immune system (the body's infection-fighting system) is weaker than normal  What are the symptoms of shingles? -- At first, shingles causes weird sensations on your skin. You might feel itching, burning, pain, or tingling. Some people get a fever, feel sick, or get a headache. Within 1 to 2 days, a rash with blisters appears. Blisters most often appear in a band across the chest and back (picture 1 and picture 2). But they can show up on other parts of the body, too. The blisters cause pain that can be mild or severe.  Within 3 to 4 days, shingles blisters can become open sores or \"ulcers.\" These ulcers can sometimes get infected. Within 7 to 10 days, the rash should scab over and start to heal.  Can shingles be serious? -- Yes. Shingles can be serious, but this is rare. About 1 out of 10 people with shingles will get something called \"postherpetic neuralgia\" (\"PHN\"). People with PHN keep feeling pain or discomfort even after their rash goes away. This pain can last for months or even years. It can be so bad that it makes it hard to sleep, causes weight loss, and leads to depression.  Shingles can also cause:   Skin infections   Eye problems (if the rash is near the eye)   Ear problems (if the rash is near the ear)  In rare cases, shingles can cause serious problems with the brain or nerves. But this is very uncommon.  Will I need tests? -- It depends. Your doctor or nurse will probably be able to tell if you have shingles by doing an exam and asking about your symptoms. In some cases, they might take a sample of fluid from your rash for testing.  If you have a rash that you think might be shingles, call your doctor or nurse right away. They will do an exam and might recommend treatment.  How is shingles treated? -- It depends on how long you have had the shingles rash:   " "If you have had the rash for less than 3 days, your doctor will prescribe a medicine to help you get rid of the virus. These medicines are called \"antivirals.\" They can speed your recovery and lower the chances of problems such as PHN.   If you have had the rash for more than 3 days, your doctor might or might not prescribe medicine. Antiviral medicine might help if new blisters are still appearing, or if your immune system is weaker than normal.  Many people can use non-prescription pain medicines to treat their pain. But some people need prescription medicines.  Is there anything I can do on my own to feel better? -- Yes. You can:   Take all of your medicines as instructed.   Keep your rash clean and dry. Do not use creams or gels unless your doctor or nurse says that you should.   Try not to scratch your skin. It might help to cover it with a clean dressing.   Wear loose clothing if this makes you more comfortable.  Can shingles be prevented? -- People can lower their chances of getting shingles by getting the shingles vaccine. The vaccine might also make shingles symptoms milder if they do occur.  The shingles vaccine is typically recommended for adults over 50 years. It might also be recommended for younger adults, if their immune system is weaker than normal. Your doctor can tell you if you should get a shingles vaccine.  If you do get shingles, you can prevent spreading the virus to other people if you:   Keep your rash covered.   Wash your hands often until your rash has scabbed over.  When should I call the doctor? -- Call your doctor or nurse right away if you think that you might have shingles. The sooner you can start treatment, the better.  If you are already being treated for shingles, call your doctor or nurse if:   Your pain gets worse and is not helped by over-the-counter medicines.   You have increased redness or swelling around your rash.   You get a fever.   You have eye symptoms like redness, " irritation, or vision problems.   You have ear symptoms like pain or trouble hearing.  All topics are updated as new evidence becomes available and our peer review process is complete.  This topic retrieved from [employer] on: Mar 20, 2024.  Topic 01618 Version 17.0  Release: 32.2.4 - C32.78  © 2024 UpToDate, Inc. and/or its affiliates. All rights reserved.  picture 1: Shingles (herpes zoster)     Graphic 98339 Version 2.0  picture 2: Shingles (herpes zoster)     Graphic 37298 Version 2.0  Consumer Information Use and Disclaimer   Disclaimer: This generalized information is a limited summary of diagnosis, treatment, and/or medication information. It is not meant to be comprehensive and should be used as a tool to help the user understand and/or assess potential diagnostic and treatment options. It does NOT include all information about conditions, treatments, medications, side effects, or risks that may apply to a specific patient. It is not intended to be medical advice or a substitute for the medical advice, diagnosis, or treatment of a health care provider based on the health care provider's examination and assessment of a patient's specific and unique circumstances. Patients must speak with a health care provider for complete information about their health, medical questions, and treatment options, including any risks or benefits regarding use of medications. This information does not endorse any treatments or medications as safe, effective, or approved for treating a specific patient. Up[employer]Date, Inc. and its affiliates disclaim any warranty or liability relating to this information or the use thereof.The use of this information is governed by the Terms of Use, available at https://www.wolterskluwer.com/en/know/clinical-effectiveness-terms. 2024© UpToDate, Inc. and its affiliates and/or licensors. All rights reserved.  Copyright   © 2024 [employer]Date, Inc. and/or its affiliates. All rights reserved.

## (undated) DEVICE — 3 BONE CEMENT MIXER: Brand: MIXEVAC

## (undated) DEVICE — HOOD WITH PEEL AWAY FACE SHIELD: Brand: T7PLUS

## (undated) DEVICE — JP 3-SPRING RES W/10FR PVC DRAIN/TR: Brand: CARDINAL HEALTH

## (undated) DEVICE — SUT VICRYL PLUS 2-0 CTB-1 27 IN VCPB259H

## (undated) DEVICE — VELYS ROBOT DRAPE

## (undated) DEVICE — GLOVE INDICATOR PI UNDERGLOVE SZ 8.5 BLUE

## (undated) DEVICE — GLOVE INDICATOR PI UNDERGLOVE SZ 7 BLUE

## (undated) DEVICE — PLUMEPEN PRO 10FT

## (undated) DEVICE — VELYS BLADE SAW OSC 85 X 19 X 2MM

## (undated) DEVICE — CAPIT KNEE ATTUNE FB W/DOM AOX

## (undated) DEVICE — HANDPIECE SET WITH RETRACTABLE COAXIAL FAN SPRAY TIP AND SUCTION TUBE: Brand: INTERPULSE

## (undated) DEVICE — STIRRUP STRAP ADULT DISP

## (undated) DEVICE — VELYS SATEL DRAPE

## (undated) DEVICE — SAW BLADE RECIPROCATING 179

## (undated) DEVICE — DRAPE SHEET X-LG

## (undated) DEVICE — HOOD: Brand: T7PLUS

## (undated) DEVICE — VELYS ROBOT-ASSISTED SOLUTION ARRAY DRILL PIN 125 MM X 4 MM: Brand: VELYS

## (undated) DEVICE — WET SKIN PREP TRAY: Brand: MEDLINE INDUSTRIES, INC.

## (undated) DEVICE — DRAPE SHEET THREE QUARTER

## (undated) DEVICE — NO-SCRATCH ™ SMALL WHITNEY CURETTE ™ IS A SINGLE-USE, PLASTIC CURETTE FOR QUICKLY APPLYING, MANIPULATING AND REMOVING BONE CEMENT DURING HIP AND KNEE REPLACEMENT SURGERY. THE PLASTIC IS SOFTER THAN STEEL INSTRUMENTS, REDUCING THE RISK OF DAMAGING THE PROSTHESIS WITH METAL INSTRUMENTS.  THE CURETTE’S 6MM TIP REMOVES EXCESS CEMENT FROM REPLACEMENT HIPS AND KNEES. EASY-TO-MANEUVER, THE SMALL BLUE CURETTE LETS YOU REMOVE CEMENT FROM ALL EDGES OF THE PROSTHESIS.NO-SCRATCH WHITNEY SMALL CURETTE FEATURES:SAFER THAN STEEL- MADE OF PLASTIC - STURDY YET SOFTER THAN SURGICAL STEEL.HANDIER- EACH TOOL HAS A MOLDED-IN THUMB INDENTATION INSTANTLY ORIENTING THE TOOL.- EASIER TO MANEUVER IN HARD TO SEE PLACES.- COLOR-CODED FOR EASY IDENTIFICATION.FASTER- COMES INDIVIDUALLY PACKAGED IN STERILE, PEEL OPEN POUCH, READY TO GO.- APPLIES, MANIPULATES, OR REMOVES CEMENT WITH FINGERTIP PRECISION.ECONOMICAL- THE COST OF A SINGLE REVISION DWARFS THE COST OF A SINGLE-USE CURETTE. - DISPOSABLE – THERE’S NO NEED TO WASTE TIME REMOVING HARDENED CEMENT OR RE-STERILIZING TOOLS.- LESS EXPENSIVE TO BUY AND INVENTORY - ORDER ONLY THE TOOL YOU USE.- PACKAGED 25 INDIVIDUALLY WRAPPED TOOLS TO A CARTON FOR CONVENIENT SHELF STORAGE.: Brand: WHITNEY NO-SCRATCH CURETTE (SMALL)

## (undated) DEVICE — ACE WRAP 6 IN UNSTERILE

## (undated) DEVICE — GLOVE SRG BIOGEL 8

## (undated) DEVICE — GAUZE SPONGES,16 PLY: Brand: CURITY

## (undated) DEVICE — SILVER-COATED ANTIMICROBIAL BARRIER DRESSING: Brand: ACTICOAT   4" X 8"

## (undated) DEVICE — PADDING CAST 4 IN  COTTON STRL

## (undated) DEVICE — GLOVE SRG BIOGEL 6.5

## (undated) DEVICE — VELYS ROBOTIC-ASSISTED SOLUTION ARRAY SET - KNEE: Brand: VELYS

## (undated) DEVICE — ABDOMINAL PAD: Brand: DERMACEA

## (undated) DEVICE — SUT VICRYL PLUS 1 CTB-1 36 IN VCPB947H

## (undated) DEVICE — 3M™ IOBAN™ 2 ANTIMICROBIAL INCISE DRAPE 6650EZ: Brand: IOBAN™ 2

## (undated) DEVICE — PAD CAST 6 IN COTTON NON STERILE

## (undated) DEVICE — BETHLEHEM UNIV TOTAL KNEE, KIT: Brand: CARDINAL HEALTH

## (undated) DEVICE — SAW BLADE OSCILLATING BRAZOL 167

## (undated) DEVICE — TRAY FOLEY 16FR URIMETER SURESTEP

## (undated) DEVICE — ACE WRAP 6 IN STERILE

## (undated) DEVICE — STOCKINETTE REGULAR